# Patient Record
Sex: FEMALE | Race: WHITE | NOT HISPANIC OR LATINO | URBAN - METROPOLITAN AREA
[De-identification: names, ages, dates, MRNs, and addresses within clinical notes are randomized per-mention and may not be internally consistent; named-entity substitution may affect disease eponyms.]

---

## 2016-10-13 RX ORDER — FLECAINIDE ACETATE 50 MG
1 TABLET ORAL
Qty: 0 | Refills: 0 | COMMUNITY
Start: 2016-10-13

## 2017-04-26 ENCOUNTER — INPATIENT (INPATIENT)
Facility: HOSPITAL | Age: 82
LOS: 12 days | Discharge: HOME HEALTH | DRG: 689 | End: 2017-05-09
Attending: INTERNAL MEDICINE | Admitting: HOSPITALIST
Payer: MEDICARE

## 2017-04-26 VITALS
TEMPERATURE: 98 F | HEART RATE: 67 BPM | HEIGHT: 61 IN | OXYGEN SATURATION: 98 % | WEIGHT: 158.07 LBS | RESPIRATION RATE: 20 BRPM | DIASTOLIC BLOOD PRESSURE: 93 MMHG | SYSTOLIC BLOOD PRESSURE: 201 MMHG

## 2017-04-26 DIAGNOSIS — I10 ESSENTIAL (PRIMARY) HYPERTENSION: ICD-10-CM

## 2017-04-26 DIAGNOSIS — E03.8 OTHER SPECIFIED HYPOTHYROIDISM: ICD-10-CM

## 2017-04-26 DIAGNOSIS — A49.9 BACTERIAL INFECTION, UNSPECIFIED: ICD-10-CM

## 2017-04-26 DIAGNOSIS — Z29.9 ENCOUNTER FOR PROPHYLACTIC MEASURES, UNSPECIFIED: ICD-10-CM

## 2017-04-26 DIAGNOSIS — I48.2 CHRONIC ATRIAL FIBRILLATION: ICD-10-CM

## 2017-04-26 DIAGNOSIS — N39.0 URINARY TRACT INFECTION, SITE NOT SPECIFIED: ICD-10-CM

## 2017-04-26 DIAGNOSIS — K21.9 GASTRO-ESOPHAGEAL REFLUX DISEASE WITHOUT ESOPHAGITIS: ICD-10-CM

## 2017-04-26 LAB
ANION GAP SERPL CALC-SCNC: 15 MMOL/L — SIGNIFICANT CHANGE UP (ref 5–17)
APPEARANCE UR: CLEAR — SIGNIFICANT CHANGE UP
APTT BLD: 51.3 SEC — HIGH (ref 27.5–37.4)
BACTERIA # UR AUTO: ABNORMAL
BILIRUB UR-MCNC: NEGATIVE — SIGNIFICANT CHANGE UP
BUN SERPL-MCNC: 12 MG/DL — SIGNIFICANT CHANGE UP (ref 8–20)
CALCIUM SERPL-MCNC: 8.9 MG/DL — SIGNIFICANT CHANGE UP (ref 8.6–10.2)
CHLORIDE SERPL-SCNC: 93 MMOL/L — LOW (ref 98–107)
CO2 SERPL-SCNC: 25 MMOL/L — SIGNIFICANT CHANGE UP (ref 22–29)
COLOR SPEC: YELLOW — SIGNIFICANT CHANGE UP
CREAT SERPL-MCNC: 0.35 MG/DL — LOW (ref 0.5–1.3)
DIFF PNL FLD: NEGATIVE — SIGNIFICANT CHANGE UP
EPI CELLS # UR: SIGNIFICANT CHANGE UP
GLUCOSE SERPL-MCNC: 86 MG/DL — SIGNIFICANT CHANGE UP (ref 70–115)
GLUCOSE UR QL: NEGATIVE MG/DL — SIGNIFICANT CHANGE UP
HCT VFR BLD CALC: 38.7 % — SIGNIFICANT CHANGE UP (ref 37–47)
HGB BLD-MCNC: 13.2 G/DL — SIGNIFICANT CHANGE UP (ref 12–16)
INR BLD: 2.12 RATIO — HIGH (ref 0.88–1.16)
KETONES UR-MCNC: ABNORMAL
LEUKOCYTE ESTERASE UR-ACNC: ABNORMAL
MCHC RBC-ENTMCNC: 30.8 PG — SIGNIFICANT CHANGE UP (ref 27–31)
MCHC RBC-ENTMCNC: 34.1 G/DL — SIGNIFICANT CHANGE UP (ref 32–36)
MCV RBC AUTO: 90.4 FL — SIGNIFICANT CHANGE UP (ref 81–99)
NITRITE UR-MCNC: NEGATIVE — SIGNIFICANT CHANGE UP
PH UR: 6 — SIGNIFICANT CHANGE UP (ref 5–8)
PLATELET # BLD AUTO: 228 K/UL — SIGNIFICANT CHANGE UP (ref 150–400)
POTASSIUM SERPL-MCNC: 4.2 MMOL/L — SIGNIFICANT CHANGE UP (ref 3.5–5.3)
POTASSIUM SERPL-SCNC: 4.2 MMOL/L — SIGNIFICANT CHANGE UP (ref 3.5–5.3)
PROT UR-MCNC: NEGATIVE MG/DL — SIGNIFICANT CHANGE UP
PROTHROM AB SERPL-ACNC: 23.7 SEC — HIGH (ref 9.8–12.7)
RBC # BLD: 4.28 M/UL — LOW (ref 4.4–5.2)
RBC # FLD: 13.8 % — SIGNIFICANT CHANGE UP (ref 11–15.6)
RBC CASTS # UR COMP ASSIST: ABNORMAL /HPF (ref 0–4)
SODIUM SERPL-SCNC: 133 MMOL/L — LOW (ref 135–145)
SP GR SPEC: 1 — LOW (ref 1.01–1.02)
UROBILINOGEN FLD QL: NEGATIVE MG/DL — SIGNIFICANT CHANGE UP
WBC # BLD: 6.3 K/UL — SIGNIFICANT CHANGE UP (ref 4.8–10.8)
WBC # FLD AUTO: 6.3 K/UL — SIGNIFICANT CHANGE UP (ref 4.8–10.8)
WBC UR QL: SIGNIFICANT CHANGE UP

## 2017-04-26 PROCEDURE — 99285 EMERGENCY DEPT VISIT HI MDM: CPT

## 2017-04-26 PROCEDURE — 99222 1ST HOSP IP/OBS MODERATE 55: CPT | Mod: AI

## 2017-04-26 RX ORDER — LEVOTHYROXINE SODIUM 125 MCG
50 TABLET ORAL DAILY
Qty: 0 | Refills: 0 | Status: DISCONTINUED | OUTPATIENT
Start: 2017-04-26 | End: 2017-05-09

## 2017-04-26 RX ORDER — ERTAPENEM SODIUM 1 G/1
1000 INJECTION, POWDER, LYOPHILIZED, FOR SOLUTION INTRAMUSCULAR; INTRAVENOUS ONCE
Qty: 0 | Refills: 0 | Status: COMPLETED | OUTPATIENT
Start: 2017-04-26 | End: 2017-04-26

## 2017-04-26 RX ORDER — LACTOBACILLUS ACIDOPHILUS 100MM CELL
1 CAPSULE ORAL
Qty: 0 | Refills: 0 | Status: DISCONTINUED | OUTPATIENT
Start: 2017-04-26 | End: 2017-05-09

## 2017-04-26 RX ORDER — HYDRALAZINE HCL 50 MG
10 TABLET ORAL EVERY 6 HOURS
Qty: 0 | Refills: 0 | Status: DISCONTINUED | OUTPATIENT
Start: 2017-04-26 | End: 2017-05-09

## 2017-04-26 RX ORDER — WARFARIN SODIUM 2.5 MG/1
5 TABLET ORAL ONCE
Qty: 0 | Refills: 0 | Status: COMPLETED | OUTPATIENT
Start: 2017-04-26 | End: 2017-04-26

## 2017-04-26 RX ORDER — FLECAINIDE ACETATE 50 MG
50 TABLET ORAL
Qty: 0 | Refills: 0 | Status: DISCONTINUED | OUTPATIENT
Start: 2017-04-26 | End: 2017-05-09

## 2017-04-26 RX ORDER — SODIUM CHLORIDE 9 MG/ML
3 INJECTION INTRAMUSCULAR; INTRAVENOUS; SUBCUTANEOUS ONCE
Qty: 0 | Refills: 0 | Status: COMPLETED | OUTPATIENT
Start: 2017-04-26 | End: 2017-04-26

## 2017-04-26 RX ORDER — PANTOPRAZOLE SODIUM 20 MG/1
40 TABLET, DELAYED RELEASE ORAL
Qty: 0 | Refills: 0 | Status: DISCONTINUED | OUTPATIENT
Start: 2017-04-26 | End: 2017-05-09

## 2017-04-26 RX ORDER — METOPROLOL TARTRATE 50 MG
25 TABLET ORAL DAILY
Qty: 0 | Refills: 0 | Status: DISCONTINUED | OUTPATIENT
Start: 2017-04-26 | End: 2017-04-26

## 2017-04-26 RX ORDER — METOPROLOL TARTRATE 50 MG
50 TABLET ORAL DAILY
Qty: 0 | Refills: 0 | Status: DISCONTINUED | OUTPATIENT
Start: 2017-04-26 | End: 2017-05-09

## 2017-04-26 RX ORDER — ACETAMINOPHEN 500 MG
650 TABLET ORAL EVERY 6 HOURS
Qty: 0 | Refills: 0 | Status: DISCONTINUED | OUTPATIENT
Start: 2017-04-26 | End: 2017-05-09

## 2017-04-26 RX ORDER — ERTAPENEM SODIUM 1 G/1
1000 INJECTION, POWDER, LYOPHILIZED, FOR SOLUTION INTRAMUSCULAR; INTRAVENOUS EVERY 24 HOURS
Qty: 0 | Refills: 0 | Status: DISCONTINUED | OUTPATIENT
Start: 2017-04-26 | End: 2017-04-27

## 2017-04-26 RX ADMIN — ERTAPENEM SODIUM 120 MILLIGRAM(S): 1 INJECTION, POWDER, LYOPHILIZED, FOR SOLUTION INTRAMUSCULAR; INTRAVENOUS at 15:55

## 2017-04-26 RX ADMIN — SODIUM CHLORIDE 3 MILLILITER(S): 9 INJECTION INTRAMUSCULAR; INTRAVENOUS; SUBCUTANEOUS at 15:55

## 2017-04-26 NOTE — H&P ADULT - PMH
Atrial Fibrillation    Cardiac Pacemaker    GERD (Gastroesophageal Reflux Disease)    HTN - Hypertension    Hypercholesteremia    Hypothyroid

## 2017-04-26 NOTE — ED PROVIDER NOTE - PSH
H/O abdominal hysterectomy    History of pacemaker    S/P lumpectomy, left breast    S/P tonsillectomy

## 2017-04-26 NOTE — ED ADULT NURSE NOTE - OBJECTIVE STATEMENT
86 yo female presents with complaints of recurrent UTI with no relief from PO antibiotics.  Patient denies any  fevers.

## 2017-04-26 NOTE — ED ADULT TRIAGE NOTE - CHIEF COMPLAINT QUOTE
pt arrived to ed sent by pmd needs to have iv antibiotic for a uti, pt is resistant to all oral meds and needs iv gentamycin

## 2017-04-26 NOTE — ED PROVIDER NOTE - MEDICAL DECISION MAKING DETAILS
PT WITH UTI AND KLEBSIELLA EBSL GROWING ON CULTURE. NEEDS IV AB B/C SENSITIVE TO ONLY ERTAPENEM, IMIPENEM AND GENT PMD IS TOMI AKHTAR AND CARDIO IS [place]

## 2017-04-26 NOTE — H&P ADULT - HISTORY OF PRESENT ILLNESS
85 year old female with PMHx HTN, Chronic atrial fibrillation s/p PPM and UTI presents with dysuria and chills. Denies any fever, but does have urinary incontinence as well and occasional lower abdominal and lower back pain. Denies any headaches but gets occasional dizziness. She also gets sore throat attributable to post nasal drip.  She was treated for a UTI with oral antibiotics recently and was told to go to the ER today because of resistant bacteria. Outpatient urine culture with >100k CFU ESBL Klebsiella pneumonia sensitive only to Carbapenems, Aminoglycosides and Zosyn.  Denies any history of nephrolithiasis, but does get recurrent UTIs.    She lives independently and uses a walker.

## 2017-04-26 NOTE — ED ADULT NURSE REASSESSMENT NOTE - NS ED NURSE REASSESS COMMENT FT1
Patient resting in bed, awake, alert and oriented X3, resp even/unlabored, lungs CTAB, VS stable, afebrile, no acute distress noted, awaiting for available bed, will continue to monitor patient.
Patient awaiting admission orders. Will monitor and reassess.

## 2017-04-26 NOTE — H&P ADULT - ASSESSMENT
85 year old female with ESBL Klebsiella pneumonia UTI (Cystitis), without any evidence of sepsis or pyelonephritis. Given sensitivity patterns needs to be on IV antibiotics.  Her HTN is uncontrolled.  Chronic atrial fibrillation is rate controlled and INR therapeutic.

## 2017-04-26 NOTE — ED PROVIDER NOTE - PMH
Atrial Fibrillation    Cardiac Pacemaker    Cardiac Pacemaker    GERD (Gastroesophageal Reflux Disease)    HTN - Hypertension    Hypercholesteremia    Hypothyroid

## 2017-04-26 NOTE — ED PROVIDER NOTE - OBJECTIVE STATEMENT
PATEINT STATES TWO WEEKS AGO SHE STARTED PASSING URINE MORE OFTEBN THAN NORMAL. SHE IS INCONTINENT BUT NOTICED SHE HAD TO CHANGE MORE FREQUENTLY. SHE SAYS PAIN STARTED WITH URINATION AFTER THAT AND IT WOULD HURT HER.  PT WENT TO AN URGENT CARE AND WAS FOUND TO HAVE KLEBSIELLA IN HER URINE RESISTANT TO MANY MEDICATIONS.  SHE WAS SENT TO THE ER FOR ADMISSION FOR IV ANTIBIOTICS. PT BROUGHT SENSIVITY REPORT WITH HER. ERTAPENIM, IMIPENIM GENT SENSITIVE.

## 2017-04-27 DIAGNOSIS — N39.0 URINARY TRACT INFECTION, SITE NOT SPECIFIED: ICD-10-CM

## 2017-04-27 LAB
LACTATE BLDV-MCNC: 0.6 MMOL/L — LOW (ref 0.5–2)
TSH SERPL-MCNC: 4.12 UIU/ML — SIGNIFICANT CHANGE UP (ref 0.27–4.2)

## 2017-04-27 PROCEDURE — 99232 SBSQ HOSP IP/OBS MODERATE 35: CPT

## 2017-04-27 PROCEDURE — 99222 1ST HOSP IP/OBS MODERATE 55: CPT

## 2017-04-27 RX ORDER — ERTAPENEM SODIUM 1 G/1
1000 INJECTION, POWDER, LYOPHILIZED, FOR SOLUTION INTRAMUSCULAR; INTRAVENOUS EVERY 24 HOURS
Qty: 0 | Refills: 0 | Status: COMPLETED | OUTPATIENT
Start: 2017-04-27 | End: 2017-05-01

## 2017-04-27 RX ORDER — WARFARIN SODIUM 2.5 MG/1
2.5 TABLET ORAL ONCE
Qty: 0 | Refills: 0 | Status: COMPLETED | OUTPATIENT
Start: 2017-04-27 | End: 2017-04-27

## 2017-04-27 RX ADMIN — Medication 1 TABLET(S): at 08:24

## 2017-04-27 RX ADMIN — PANTOPRAZOLE SODIUM 40 MILLIGRAM(S): 20 TABLET, DELAYED RELEASE ORAL at 08:24

## 2017-04-27 RX ADMIN — WARFARIN SODIUM 5 MILLIGRAM(S): 2.5 TABLET ORAL at 01:21

## 2017-04-27 RX ADMIN — Medication 50 MICROGRAM(S): at 05:18

## 2017-04-27 RX ADMIN — Medication 10 MILLIGRAM(S): at 04:24

## 2017-04-27 RX ADMIN — Medication 10 MILLIGRAM(S): at 22:03

## 2017-04-27 RX ADMIN — Medication 1 TABLET(S): at 18:34

## 2017-04-27 RX ADMIN — Medication 50 MILLIGRAM(S): at 05:18

## 2017-04-27 RX ADMIN — Medication 1 TABLET(S): at 12:37

## 2017-04-27 RX ADMIN — ERTAPENEM SODIUM 120 MILLIGRAM(S): 1 INJECTION, POWDER, LYOPHILIZED, FOR SOLUTION INTRAMUSCULAR; INTRAVENOUS at 18:35

## 2017-04-27 RX ADMIN — WARFARIN SODIUM 2.5 MILLIGRAM(S): 2.5 TABLET ORAL at 22:03

## 2017-04-27 RX ADMIN — Medication 650 MILLIGRAM(S): at 08:24

## 2017-04-27 RX ADMIN — Medication 50 MILLIGRAM(S): at 18:34

## 2017-04-27 NOTE — PROGRESS NOTE ADULT - SUBJECTIVE AND OBJECTIVE BOX
HOSPITALIST PROGRESS NOTE    TABITHA HERMOSILLO  [id number]  85yFemale    Patient is a 85y old  Female who presents with a chief complaint of 'urinary tract infection' (2017 21:29)      SUBJECTIVE:   Chart reviewed since last visit.  Patient seen and examined at bedside earlier today for ESBL UTI.  Still feels chills, no fever.  Lower abdominal and lower back pain. Still has dysuria.  No dizziness, nausea or vomiting.  No chest pain, dyspnea or cough.      OBJECTIVE:  Vital Signs Last 24 Hrs  T(C): 36.8, Max: 36.8 ( @ 11:26)  T(F): 98.2, Max: 98.2 ( @ 11:26)  HR: 61 (60 - 70)  BP: 130/90 (108/72 - 185/94)  RR: 18 (18 - 18)  SpO2: 98% (96% - 100%)    PHYSICAL EXAMINATION  General: NAD[+]   nontoxic[+]     HEENT: AT/NC[+]    NECK: Supple[+]  Carotid Bruit[+]  CVS:   Irregular[+]  S1+S2[+]   Murmur[+]  RESP: Fair air entry bilaterally[+]   Clear sounds[+]    GI: Soft[+]  Nondistended[+]   Tender lower abdomen   Bowel Sounds[+]    : suprapubic tenderness[+]   CVA Tenderness[-]   Andrea[-]  MS: FROM[+]   Edema[-]  CNS: AAOx3[+]    No gross deficit appreciated  INTEG: Skin is Warm[+]  dry[+]   PSYCH: Fair Mood, Affect    I&O's Summary                          13.2   6.3   )-----------( 228      ( 2017 15:36 )             38.7     PT/INR - ( 2017 15:36 )   PT: 23.7 sec;   INR: 2.12 ratio         PTT - ( 2017 15:36 )  PTT:51.3 sec      133<L>  |  93<L>  |  12.0  ----------------------------<  86  4.2   |  25.0  |  0.35<L>    Ca    8.9      2017 15:36          Urinalysis Basic - ( 2017 15:50 )    Color: Yellow / Appearance: Clear / S.005 / pH: x  Gluc: x / Ketone: Small  / Bili: Negative / Urobili: Negative mg/dL   Blood: x / Protein: Negative mg/dL / Nitrite: Negative   Leuk Esterase: Trace / RBC: 6-10 /HPF / WBC 3-5   Sq Epi: x / Non Sq Epi: Occasional / Bacteria: Occasional        Culture:  Culture - Urine (17 @ 15:50)    Specimen Source: .Urine Catheterized    Culture Results:   10,000 - 49,000 CFU/mL Gram Negative Rods Identification and  susceptibility to follow.          MEDICATIONS  (STANDING):  levothyroxine 50MICROGram(s) Oral daily  pantoprazole    Tablet 40milliGRAM(s) Oral before breakfast  multivitamin 1Tablet(s) Oral daily  flecainide 50milliGRAM(s) Oral two times a day  lactobacillus acidophilus 1Tablet(s) Oral two times a day with meals  metoprolol succinate ER 50milliGRAM(s) Oral daily  ertapenem  IVPB 1000milliGRAM(s) IV Intermittent every 24 hours      MEDICATIONS  (PRN):  acetaminophen   Tablet 650milliGRAM(s) Oral every 6 hours PRN For Temp greater than 38 C (100.4 F)  hydrALAZINE Injectable 10milliGRAM(s) IV Push every 6 hours PRN SBP>180, DBP>95

## 2017-04-27 NOTE — CONSULT NOTE ADULT - SUBJECTIVE AND OBJECTIVE BOX
NPP INFECTIOUS DISEASES AND INTERNAL MEDICINE [facility]  =======================================================  Moustapha Cedeño MD [facility]   Jorge Ring MD  Diplomates American Board of Internal Medicine and Infectious Diseases  =======================================================    [id number]  TABITHA HERMOSILLO is a 85y  Female     85 y.o. F with HTN, Chronic atrial fibrillation s/p PPM, recurrent UTI presents with dysuria and chills. + urinary incontinence as well and occasional lower abdominal and lower back pain.     Outpatient urine culture with >100k CFU ESBL Klebsiella pneumonia sensitive only to Carbapenems, Aminoglycosides and Zosyn.  Denies any history of nephrolithiasis, but does get recurrent UTIs.    =======================================================  Past Medical & Surgical Hx:  =====================  PAST MEDICAL & SURGICAL HISTORY:  Cardiac Pacemaker  Cardiac Pacemaker  Atrial Fibrillation  Hypothyroid  Hypercholesteremia  GERD (Gastroesophageal Reflux Disease)  HTN - Hypertension  S/P lumpectomy, left breast  S/P tonsillectomy  History of pacemaker  H/O abdominal hysterectomy      Problem List:  ==========  HEALTH ISSUES - PROBLEM Dx:  Prophylactic measure: Prophylactic measure  Gastroesophageal reflux disease without esophagitis: Gastroesophageal reflux disease without esophagitis  Other specified hypothyroidism: Other specified hypothyroidism  Chronic atrial fibrillation: Chronic atrial fibrillation  Uncontrolled hypertension: Uncontrolled hypertension  ESBL (extended spectrum beta-lactamase) producing bacteria infection: ESBL (extended spectrum beta-lactamase) producing bacteria infection    Social Hx:  =======  no toxic habits    FAMILY HISTORY:  No pertinent family history in first degree relatives      Allergies    No Known Allergies    Intolerances       MEDICATIONS  (STANDING):  ertapenem  IVPB 1000milliGRAM(s) IV Intermittent every 24 hours  levothyroxine 50MICROGram(s) Oral daily  pantoprazole    Tablet 40milliGRAM(s) Oral before breakfast  multivitamin 1Tablet(s) Oral daily  flecainide 50milliGRAM(s) Oral two times a day  lactobacillus acidophilus 1Tablet(s) Oral two times a day with meals  metoprolol succinate ER 50milliGRAM(s) Oral daily       ANTIBIOTICS:      =======================================================  REVIEW OF SYSTEMS:  CONSTITUTIONAL:  as per HPI  HEENT:  Eyes:  No diplopia or blurred vision. ENT:  No earache, sore throat or runny nose.  CARDIOVASCULAR:  No pressure, squeezing, strangling, tightness, heaviness or aching about the chest, neck, axilla or epigastrium.  RESPIRATORY:  No cough, shortness of breath, PND or orthopnea.  GASTROINTESTINAL:  No nausea, vomiting or diarrhea.  GENITOURINARY:  No dysuria, frequency or urgency. No Blood in urine  MUSCULOSKELETAL:  no joint aches, no muscle pain  SKIN:  No change in skin, hair or nails.  NEUROLOGIC:  No paresthesias, fasciculations, seizures or weakness.  PSYCHIATRIC:  No disorder of thought or mood.  ENDOCRINE:  No heat or cold intolerance, polyuria or polydipsia.  HEMATOLOGICAL:  No easy bruising or bleeding.     =======================================================  I&O's Detail      Physical Exam:  ============  Vital Signs Last 24 Hrs  T(C): 36.8, Max: 36.8 ( @ 19:08)  T(F): 98.2, Max: 98.3 ( @ 19:08)  HR: 61 (60 - 70)  BP: 130/90 (108/72 - 201/93)  RR: 18 (18 - 20)  SpO2: 98% (96% - 100%)  Height (cm): 154.9 ( @ 13:17)  Weight (kg): 71.7 ( @ 13:17)  BMI (kg/m2): 29.9 ( @ 13:17)  BSA (m2): 1.71 ( @ 13:17)    GEN: NAD, pleasant  HEENT: normocephalic and atraumatic. EOMI. LEORA.    NECK: Supple. No carotid bruits.  No lymphadenopathy or thyromegaly.  LUNGS: Clear to auscultation.  HEART: Regular rate and rhythm without murmur.  ABDOMEN: Soft, nontender, and nondistended.  Positive bowel sounds.    EXTREMITIES: Without any cyanosis, clubbing, rash, lesions or edema.  NEUROLOGIC: Cranial nerves II through XII are grossly intact.  PSYCHIATRIC: Appropriate affect .  SKIN: No ulceration or induration present.    =======================================================  Labs:  ====       133<L>  |  93<L>  |  12.0  ----------------------------<  86  4.2   |  25.0  |  0.35<L>    Ca    8.9      2017 15:36                            13.2   6.3   )-----------( 228      ( 2017 15:36 )             38.7       PT/INR - ( 2017 15:36 )   PT: 23.7 sec;   INR: 2.12 ratio         PTT - ( 2017 15:36 )  PTT:51.3 sec  Urinalysis Basic - ( 2017 15:50 )    Color: Yellow / Appearance: Clear / S.005 / pH: x  Gluc: x / Ketone: Small  / Bili: Negative / Urobili: Negative mg/dL   Blood: x / Protein: Negative mg/dL / Nitrite: Negative   Leuk Esterase: Trace / RBC: 6-10 /HPF / WBC 3-5   Sq Epi: x / Non Sq Epi: Occasional / Bacteria: Occasional       RECENT CULTURES:   .Urine Catheterized XXXX XXXX   10,000 - 49,000 CFU/mL Gram Negative Rods Identification and  susceptibility to follow. NPP INFECTIOUS DISEASES AND INTERNAL MEDICINE [facility] [place]  =======================================================  Moustapha Cedeño MD [facility]   Jorge Ring MD  Diplomates American Board of Internal Medicine and Infectious Diseases  =======================================================    [id number]  TABITHA HERMOSILLO is a 85y  Female     85 y.o. F with HTN, Chronic atrial fibrillation s/p PPM, recurrent UTI presents with dysuria and chills.   patient reported having symptoms several weeks back. but this past Saturday 5 days prior, developed lower abd pain, assoc with dysuria, foul smelling and TURBID urine.  She went to [facility] on her daughter's insistence (AROLDO) and was given 5 days of MACROBID with little to no improvement.   UPON REVIEW of cultures at [facility]. told that she has Resistant MDR Kleb pneumoniae and sent to [facility] for IV antibiotics.      Outpatient urine culture with >100k CFU ESBL Klebsiella pneumonia sensitive only to Carbapenems, Aminoglycosides and Zosyn.  Denies any history of nephrolithiasis, but does get recurrent UTIs.    + Urinary incontinence  DOES NOT LIKE to drink water.     =======================================================  Past Medical & Surgical Hx:  =====================  PAST MEDICAL & SURGICAL HISTORY:  Cardiac Pacemaker  Cardiac Pacemaker  Atrial Fibrillation  Hypothyroid  Hypercholesteremia  GERD (Gastroesophageal Reflux Disease)  HTN - Hypertension  S/P lumpectomy, left breast  S/P tonsillectomy  History of pacemaker  H/O abdominal hysterectomy    Problem List:  ==========  HEALTH ISSUES - PROBLEM Dx:  Prophylactic measure: Prophylactic measure  Gastroesophageal reflux disease without esophagitis: Gastroesophageal reflux disease without esophagitis  Other specified hypothyroidism: Other specified hypothyroidism  Chronic atrial fibrillation: Chronic atrial fibrillation  Uncontrolled hypertension: Uncontrolled hypertension  ESBL (extended spectrum beta-lactamase) producing bacteria infection: ESBL (extended spectrum beta-lactamase) producing bacteria infection    Social Hx:  =======  no toxic habits    FAMILY HISTORY:  No pertinent family history in first degree relatives      Allergies    No Known Allergies    Intolerances       MEDICATIONS  (STANDING):  ertapenem  IVPB 1000milliGRAM(s) IV Intermittent every 24 hours  levothyroxine 50MICROGram(s) Oral daily  pantoprazole    Tablet 40milliGRAM(s) Oral before breakfast  multivitamin 1Tablet(s) Oral daily  flecainide 50milliGRAM(s) Oral two times a day  lactobacillus acidophilus 1Tablet(s) Oral two times a day with meals  metoprolol succinate ER 50milliGRAM(s) Oral daily         =======================================================  REVIEW OF SYSTEMS:  CONSTITUTIONAL:  as per HPI  HEENT:  Eyes:  No diplopia or blurred vision. ENT:  No earache, sore throat or runny nose.  CARDIOVASCULAR:  No pressure, squeezing, strangling, tightness, heaviness or aching about the chest, neck, axilla or epigastrium.  RESPIRATORY:  No cough, shortness of breath, PND or orthopnea.  GASTROINTESTINAL:  No nausea, vomiting or diarrhea.  GENITOURINARY:  + LOWER ABD PAIN  MUSCULOSKELETAL:  no joint aches, no muscle pain  SKIN:  No change in skin, hair or nails.  NEUROLOGIC:  No paresthesias, fasciculations, seizures or weakness.  PSYCHIATRIC:  No disorder of thought or mood.  ENDOCRINE:  No heat or cold intolerance, polyuria or polydipsia.  HEMATOLOGICAL:  No easy bruising or bleeding.     =======================================================  Physical Exam:  ============  Vital Signs Last 24 Hrs  T(C): 36.8, Max: 36.8 ( @ 19:08)  T(F): 98.2, Max: 98.3 ( @ 19:08)  HR: 61 (60 - 70)  BP: 130/90 (108/72 - 201/93)  RR: 18 (18 - 20)  SpO2: 98% (96% - 100%)  Height (cm): 154.9 ( @ 13:17)  Weight (kg): 71.7 ( @ 13:17)  BMI (kg/m2): 29.9 ( @ 13:17)  BSA (m2): 1.71 ( @ 13:17)    GEN: NAD, pleasant, obese  HEENT: normocephalic and atraumatic. EOMI. LEORA.    NECK: Supple. No carotid bruits.  No lymphadenopathy or thyromegaly.  LUNGS: Clear to auscultation.  HEART: Regular rate and rhythm without murmur.  ABDOMEN: Soft,  and nondistended.  Positive bowel sounds.  + SUPRAPUBIC TENDERNESS  NO CVA tenderness  EXTREMITIES: Without any cyanosis, clubbing, rash, lesions or edema.  NEUROLOGIC: Cranial nerves II through XII are grossly intact.  PSYCHIATRIC: Appropriate affect .  SKIN: No ulceration or induration present.    =======================================================  Labs:  ====       133<L>  |  93<L>  |  12.0  ----------------------------<  86  4.2   |  25.0  |  0.35<L>    Ca    8.9      2017 15:36                            13.2   6.3   )-----------( 228      ( 2017 15:36 )             38.7       PT/INR - ( 2017 15:36 )   PT: 23.7 sec;   INR: 2.12 ratio         PTT - ( 2017 15:36 )  PTT:51.3 sec  Urinalysis Basic - ( 2017 15:50 )    Color: Yellow / Appearance: Clear / S.005 / pH: x  Gluc: x / Ketone: Small  / Bili: Negative / Urobili: Negative mg/dL   Blood: x / Protein: Negative mg/dL / Nitrite: Negative   Leuk Esterase: Trace / RBC: 6-10 /HPF / WBC 3-5   Sq Epi: x / Non Sq Epi: Occasional / Bacteria: Occasional       RECENT CULTURES:   .Urine Catheterized XXXX XXXX   10,000 - 49,000 CFU/mL Gram Negative Rods Identification and  susceptibility to follow.

## 2017-04-27 NOTE — CONSULT NOTE ADULT - ASSESSMENT
This 85 y.o. woman with Reucrrent UTI; now with ESBL Kleb pneumoniae (PER REPORT); outpatient URINE CX not available for review This 85 y.o. woman with Recurrent UTI; now with ESBL Kleb pneumoniae; outpatient URINE CX available for review on [employer]

## 2017-04-27 NOTE — PROGRESS NOTE ADULT - ASSESSMENT
85 year old female with ESBL Klebsiella pneumonia UTI (Cystitis), without any evidence of sepsis or pyelonephritis. Given sensitivity patterns needs to be on IV antibiotics. urine culture obtained with GNR.  HTN  uncontrolled at admission - improved.  Chronic atrial fibrillation is rate controlled and INR therapeutic.  TSH - 4.2, normal.

## 2017-04-28 DIAGNOSIS — K86.9 DISEASE OF PANCREAS, UNSPECIFIED: ICD-10-CM

## 2017-04-28 LAB
ALBUMIN SERPL ELPH-MCNC: 3.3 G/DL — SIGNIFICANT CHANGE UP (ref 3.3–5.2)
ALP SERPL-CCNC: 574 U/L — HIGH (ref 40–120)
ALT FLD-CCNC: 89 U/L — HIGH
AMYLASE P1 CFR SERPL: 27 U/L — LOW (ref 36–128)
ANION GAP SERPL CALC-SCNC: 10 MMOL/L — SIGNIFICANT CHANGE UP (ref 5–17)
AST SERPL-CCNC: 84 U/L — HIGH
BILIRUB SERPL-MCNC: 1 MG/DL — SIGNIFICANT CHANGE UP (ref 0.4–2)
BUN SERPL-MCNC: 14 MG/DL — SIGNIFICANT CHANGE UP (ref 8–20)
CALCIUM SERPL-MCNC: 9.1 MG/DL — SIGNIFICANT CHANGE UP (ref 8.6–10.2)
CHLORIDE SERPL-SCNC: 100 MMOL/L — SIGNIFICANT CHANGE UP (ref 98–107)
CO2 SERPL-SCNC: 29 MMOL/L — SIGNIFICANT CHANGE UP (ref 22–29)
CREAT SERPL-MCNC: 0.47 MG/DL — LOW (ref 0.5–1.3)
GLUCOSE SERPL-MCNC: 86 MG/DL — SIGNIFICANT CHANGE UP (ref 70–115)
INR BLD: 1.85 RATIO — HIGH (ref 0.88–1.16)
LIDOCAIN IGE QN: 60 U/L — HIGH (ref 22–51)
POTASSIUM SERPL-MCNC: 4.4 MMOL/L — SIGNIFICANT CHANGE UP (ref 3.5–5.3)
POTASSIUM SERPL-SCNC: 4.4 MMOL/L — SIGNIFICANT CHANGE UP (ref 3.5–5.3)
PROT SERPL-MCNC: 6.8 G/DL — SIGNIFICANT CHANGE UP (ref 6.6–8.7)
PROTHROM AB SERPL-ACNC: 20.6 SEC — HIGH (ref 9.8–12.7)
SODIUM SERPL-SCNC: 139 MMOL/L — SIGNIFICANT CHANGE UP (ref 135–145)

## 2017-04-28 PROCEDURE — 99232 SBSQ HOSP IP/OBS MODERATE 35: CPT

## 2017-04-28 PROCEDURE — 74178 CT ABD&PLV WO CNTR FLWD CNTR: CPT | Mod: 26

## 2017-04-28 RX ORDER — WARFARIN SODIUM 2.5 MG/1
5 TABLET ORAL ONCE
Qty: 0 | Refills: 0 | Status: COMPLETED | OUTPATIENT
Start: 2017-04-28 | End: 2017-04-28

## 2017-04-28 RX ORDER — AMLODIPINE BESYLATE 2.5 MG/1
2.5 TABLET ORAL DAILY
Qty: 0 | Refills: 0 | Status: DISCONTINUED | OUTPATIENT
Start: 2017-04-28 | End: 2017-04-30

## 2017-04-28 RX ADMIN — Medication 50 MILLIGRAM(S): at 05:30

## 2017-04-28 RX ADMIN — Medication 1 TABLET(S): at 10:48

## 2017-04-28 RX ADMIN — PANTOPRAZOLE SODIUM 40 MILLIGRAM(S): 20 TABLET, DELAYED RELEASE ORAL at 10:48

## 2017-04-28 RX ADMIN — AMLODIPINE BESYLATE 2.5 MILLIGRAM(S): 2.5 TABLET ORAL at 17:32

## 2017-04-28 RX ADMIN — ERTAPENEM SODIUM 120 MILLIGRAM(S): 1 INJECTION, POWDER, LYOPHILIZED, FOR SOLUTION INTRAMUSCULAR; INTRAVENOUS at 17:32

## 2017-04-28 RX ADMIN — WARFARIN SODIUM 5 MILLIGRAM(S): 2.5 TABLET ORAL at 22:29

## 2017-04-28 RX ADMIN — Medication 50 MICROGRAM(S): at 05:30

## 2017-04-28 RX ADMIN — Medication 50 MILLIGRAM(S): at 17:32

## 2017-04-28 RX ADMIN — Medication 1 TABLET(S): at 17:32

## 2017-04-28 NOTE — PROGRESS NOTE ADULT - PROBLEM SELECTOR PLAN 1
Continue IV antibiotics  Follow urine culture Id/S, blood cultures.  Obtain CT scan abdomen pelvis Continue IV antibiotics  Follow urine culture Id/S, blood cultures.  ID consult

## 2017-04-28 NOTE — PROGRESS NOTE ADULT - PROBLEM SELECTOR PLAN 2
Continue Metoprolol.  PRN Hydralazine Check LFT  Lipase  CA -19-9  Surgery Consult - [place]  CT pancreatic protocol

## 2017-04-28 NOTE — PROGRESS NOTE ADULT - SUBJECTIVE AND OBJECTIVE BOX
HOSPITALIST PROGRESS NOTE    TABITHA HERMOSILLO  [id number]  85yFemale    Patient is a 85y old  Female who presents with a chief complaint of 'urinary tract infection' (26 Apr 2017 21:29)      SUBJECTIVE:   Chart reviewed since last visit.  Patient seen and examined at bedside.      OBJECTIVE:  Vital Signs Last 24 Hrs  T(C): 36.3, Max: 36.8 (04-27 @ 20:10)  T(F): 97.4, Max: 98.3 (04-27 @ 20:10)  HR: 68 (65 - 73)  BP: 143/64 (143/64 - 191/89)  BP(mean): --  RR: 20 (18 - 20)  SpO2: 95% (95% - 98%)    PHYSICAL EXAMINATION  General: NAD[]   nontoxic[]   ill-appearing[]  Obese[]  HEENT: AT/NC[]  PERRLA[]  EOMI[]   Moist oral mucosa[]  Pharyngeal exudates[]  NECK: Supple[]  JVD[] Carotid bruit[]  CVS: RRR[]  Irregular[]  S1+S2[]   Murmur[]  RESP: Fair air entry bilaterally[]   Clear sounds[]   poor effort []  wheeze[]   Crackles[]  GI: Soft[]  Nondistended[]   Nontender[]   Bowel Sounds[]  Mass[]   HSM[]   Ascites[]  : suprapubic tenderness[]   CVA Tenderness[]   Andrea[]  MS: FROM[]   Edema[]  CNS: AAOx3[]    Motor strength /5     DTR +    Sensory    Coordination    Gait  INTEG: Skin is Warm[]  dry[] Lesion[] Decubitus[]  PSYCH: Mood, Affect    MONITOR:  CAPILLARY BLOOD GLUCOSE        I&O's Summary      PT/INR - ( 28 Apr 2017 06:26 )   PT: 20.6 sec;   INR: 1.85 ratio                         Culture:    TTE:    RADIOLOGY        MEDICATIONS  (STANDING):  levothyroxine 50MICROGram(s) Oral daily  pantoprazole    Tablet 40milliGRAM(s) Oral before breakfast  multivitamin 1Tablet(s) Oral daily  flecainide 50milliGRAM(s) Oral two times a day  lactobacillus acidophilus 1Tablet(s) Oral two times a day with meals  metoprolol succinate ER 50milliGRAM(s) Oral daily  ertapenem  IVPB 1000milliGRAM(s) IV Intermittent every 24 hours  amLODIPine   Tablet 2.5milliGRAM(s) Oral daily  warfarin 5milliGRAM(s) Oral once      MEDICATIONS  (PRN):  acetaminophen   Tablet 650milliGRAM(s) Oral every 6 hours PRN For Temp greater than 38 C (100.4 F)  hydrALAZINE Injectable 10milliGRAM(s) IV Push every 6 hours PRN SBP>180, DBP>95 HOSPITALIST PROGRESS NOTE    TABITHA HERMOSILLO  [id number]  85yFemale    Patient is a 85y old  Female who presents with a chief complaint of 'urinary tract infection' (26 Apr 2017 21:29)      SUBJECTIVE:   Chart reviewed since last visit.  Patient seen and examined at bedside for MDR UTI.  Denies fever or chills - still with lower abdominal and back pain.  Denies any upper abdominal pain, nausea, vomiting.  having diarrhea soft BM 2-3 x / day      OBJECTIVE:  Vital Signs Last 24 Hrs  T(C): 36.3, Max: 36.8 (04-27 @ 20:10)  T(F): 97.4, Max: 98.3 (04-27 @ 20:10)  HR: 68 (65 - 73)  BP: 143/64 (143/64 - 191/89)  RR: 20 (18 - 20)  SpO2: 95% (95% - 98%)    PHYSICAL EXAMINATION  General: NAD[+]   nontoxic[+]     HEENT: AT/NC[+]    NECK: Supple[+]  Carotid Bruit[+]  CVS:   Irregular[+]  S1+S2[+]   Murmur[+]  RESP: Fair air entry bilaterally[+]   Clear sounds[+]    GI: Soft[+]  Nondistended[+]   Tender lower abdomen   Bowel Sounds[+]    : suprapubic tenderness[+]   CVA Tenderness[-]   Andrea[-]  MS: FROM[+]   Edema[-]  CNS: AAOx3[+]    No gross deficit appreciated  INTEG: Skin is Warm[+]  dry[+]   PSYCH: Fair Mood, Affect      Culture - Urine (04.26.17 @ 15:50)    Specimen Source: .Urine Catheterized    Culture Results:   10,000 - 49,000 CFU/mL Gram Negative Rods Identification and  susceptibility to follow.      PT/INR - ( 28 Apr 2017 06:26 )   PT: 20.6 sec;   INR: 1.85 ratio           RADIOLOGY  IMPRESSION:    Dilatation of the intrahepatic and extrahepatic biliary tree secondary to   obstruction of the distal common bile duct. 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    Hyperdense cyst as well as simple cysts in the right kidney. The kidneys   and ureters demonstrate no other abnormality.  No abnormality of the   urinary bladder demonstrated.    These critical values were discussed by Dr. Enmanuel Ross with Dr. MEDHAT Akers on 4/28/2017 11:20 AM with read back.        ENMANUEL ROSS M.D., ATTENDING RADIOLOGIST  This document has been electronically signed. Apr 28 2017 11:21AM      MEDICATIONS  (STANDING):  levothyroxine 50MICROGram(s) Oral daily  pantoprazole    Tablet 40milliGRAM(s) Oral before breakfast  multivitamin 1Tablet(s) Oral daily  flecainide 50milliGRAM(s) Oral two times a day  lactobacillus acidophilus 1Tablet(s) Oral two times a day with meals  metoprolol succinate ER 50milliGRAM(s) Oral daily  ertapenem  IVPB 1000milliGRAM(s) IV Intermittent every 24 hours  amLODIPine   Tablet 2.5milliGRAM(s) Oral daily  warfarin 5milliGRAM(s) Oral once      MEDICATIONS  (PRN):  acetaminophen   Tablet 650milliGRAM(s) Oral every 6 hours PRN For Temp greater than 38 C (100.4 F)  hydrALAZINE Injectable 10milliGRAM(s) IV Push every 6 hours PRN SBP>180, DBP>95

## 2017-04-28 NOTE — CONSULT NOTE ADULT - SUBJECTIVE AND OBJECTIVE BOX
85 year old female with PMHx HTN, Chronic atrial fibrillation s/p PPM and UTI presents with dysuria and chills. Denies any fever, but does have urinary incontinence as well and occasional lower abdominal and lower back pain. Denies any headaches but gets occasional dizziness. She also gets sore throat attributable to post nasal drip.  She was treated for a UTI with oral antibiotics recently and was told to go to the ER today because of resistant bacteria. Outpatient urine culture with >100k CFU ESBL Klebsiella pneumonia sensitive only to Carbapenems, Aminoglycosides and Zosyn.  Denies any history of nephrolithiasis, but does get recurrent UTIs.    Pt had CT of A/P which showed pancreatic mass at head of pancreas measuring 2.8 x 2.7cm. Dilation of GB CBD of 1.6cm. PT states she knew about this pancreatic mass 10 years ago, where she had a bx and it was shown to be negative. She denies in upper abdominal pain, Nausea, or vomiting. Pain is in the lower quadrants b/l. She is being admitted to medicine for a UTI       CURRENT MEDICAL PROBLEMS:  Pancreatic mass  Urinary tract infection without hematuria, site unspecified  Prophylactic measure  Gastroesophageal reflux disease without esophagitis  Other specified hypothyroidism  Chronic atrial fibrillation  Uncontrolled hypertension  ESBL (extended spectrum beta-lactamase) producing bacteria infection      PAST MEDICAL HISTORY:  Cardiac Pacemaker  Cardiac Pacemaker  Atrial Fibrillation  Hypothyroid  Hypercholesteremia  GERD (Gastroesophageal Reflux Disease)  HTN - Hypertension      SURGICAL HISTORY:  S/P lumpectomy, left breast  S/P tonsillectomy  History of pacemaker  H/O abdominal hysterectomy      REVIEW OF SYSTEMS:    CONSTITUTIONAL: No fever, weight loss, or fatigue  ENMT:  No difficulty hearing, tinnitus, vertigo; No sinus or throat pain  NECK: No pain or stiffness  BREASTS: No pain, masses, or nipple discharge  RESPIRATORY: No cough, wheezing, chills or hemoptysis; No shortness of breath  CARDIOVASCULAR: No chest pain, palpitations, dizziness, or leg swelling  GASTROINTESTINAL: No abdominal or epigastric pain. No nausea, vomiting, or hematemesis; No diarrhea or constipation. No melena or hematochezia.  GENITOURINARY: No dysuria, frequency, hematuria, or incontinence  NEUROLOGICAL: No headaches, memory loss, loss of strength, numbness, or tremors  SKIN: No itching, burning, rashes, or lesions   MUSCULOSKELETAL: No joint pain or swelling; No muscle, back, or extremity pain  ALLERGY AND IMMUNOLOGIC: No hives or eczema      MEDICATIONS  (STANDING):  levothyroxine 50MICROGram(s) Oral daily  pantoprazole    Tablet 40milliGRAM(s) Oral before breakfast  multivitamin 1Tablet(s) Oral daily  flecainide 50milliGRAM(s) Oral two times a day  lactobacillus acidophilus 1Tablet(s) Oral two times a day with meals  metoprolol succinate ER 50milliGRAM(s) Oral daily  ertapenem  IVPB 1000milliGRAM(s) IV Intermittent every 24 hours  amLODIPine   Tablet 2.5milliGRAM(s) Oral daily  warfarin 5milliGRAM(s) Oral once    MEDICATIONS  (PRN):  acetaminophen   Tablet 650milliGRAM(s) Oral every 6 hours PRN For Temp greater than 38 C (100.4 F)  hydrALAZINE Injectable 10milliGRAM(s) IV Push every 6 hours PRN SBP>180, DBP>95      Allergies    No Known Allergies    Intolerances        SOCIAL HISTORY:    FAMILY HISTORY:  No pertinent family history in first degree relatives      Vital Signs Last 24 Hrs  T(C): 36.3, Max: 36.8 (04-27 @ 20:10)  T(F): 97.4, Max: 98.3 (04-27 @ 20:10)  HR: 71 (65 - 73)  BP: 166/80 (143/64 - 191/89)  BP(mean): --  RR: 20 (18 - 20)  SpO2: 95% (95% - 98%)    PHYSICAL EXAM:    GENERAL: NAD, well-groomed, well-developed  HEAD:  Atraumatic, Normocephalic  EYES: EOMI, PERRLA, conjunctiva and sclera clear  ENMT: No tonsillar erythema, exudates, or enlargement; Moist mucous membranes, Good dentition, No lesions  NECK: Supple, No JVD, Normal thyroid  NERVOUS SYSTEM:  Alert & Oriented X3, Good concentration; Motor Strength 5/5 B/L upper and lower extremities; DTRs 2+ intact and symmetric  CHEST/LUNG: Clear to percussion bilaterally; No rales, rhonchi, wheezing, or rubs  HEART: Regular rate and rhythm; No murmurs, rubs, or gallops  ABDOMEN: Soft, TTP of the lower quadrants  Nondistended; Bowel sounds present  EXTREMITIES:  2+ Peripheral Pulses, No clubbing, cyanosis, or edema  LYMPH: No lymphadenopathy noted  SKIN: No rashes or lesions    LABS:    04-28    139  |  100  |  14.0  ----------------------------<  86  4.4   |  29.0  |  0.47<L>    Ca    9.1      28 Apr 2017 17:54    TPro  6.8  /  Alb  3.3  /  TBili  1.0  /  DBili  x   /  AST  84<H>  /  ALT  89<H>  /  AlkPhos  574<H>  04-28    PT/INR - ( 28 Apr 2017 06:26 )   PT: 20.6 sec;   INR: 1.85 ratio                   RADIOLOGY & ADDITIONAL STUDIES:

## 2017-04-28 NOTE — PROGRESS NOTE ADULT - SUBJECTIVE AND OBJECTIVE BOX
TABITHA HERMOSILLO is a 85y Female with HPI:  85 year old female with PMHx HTN, Chronic atrial fibrillation s/p PPM and UTI presents with dysuria and chills. Denies any fever, but does have urinary incontinence as well and occasional lower abdominal and lower back pain. Denies any headaches but gets occasional dizziness. She also gets sore throat attributable to post nasal drip.  She was treated for a UTI with oral antibiotics recently and was told to go to the ER today because of resistant bacteria. Outpatient urine culture with >100k CFU ESBL Klebsiella pneumonia sensitive only to Carbapenems, Aminoglycosides and Zosyn.  Denies any history of nephrolithiasis, but does get recurrent UTIs.  PT IN NAD STILL IN ER    She lives independently and uses a walker. (26 Apr 2017 21:29)        Allergies:  No Known Allergies      Medications:  acetaminophen   Tablet 650milliGRAM(s) Oral every 6 hours PRN  levothyroxine 50MICROGram(s) Oral daily  pantoprazole    Tablet 40milliGRAM(s) Oral before breakfast  multivitamin 1Tablet(s) Oral daily  flecainide 50milliGRAM(s) Oral two times a day  lactobacillus acidophilus 1Tablet(s) Oral two times a day with meals  metoprolol succinate ER 50milliGRAM(s) Oral daily  hydrALAZINE Injectable 10milliGRAM(s) IV Push every 6 hours PRN  ertapenem  IVPB 1000milliGRAM(s) IV Intermittent every 24 hours  amLODIPine   Tablet 2.5milliGRAM(s) Oral daily  warfarin 5milliGRAM(s) Oral once      ANTIBIOTICS:         Review of Systems: - Negative except as mentioned above     Physical Exam:  ICU Vital Signs Last 24 Hrs  T(C): 36.3, Max: 36.8 (04-27 @ 20:10)  T(F): 97.4, Max: 98.3 (04-27 @ 20:10)  HR: 71 (65 - 73)  BP: 166/80 (143/64 - 191/89)  BP(mean): --  ABP: --  ABP(mean): --  RR: 20 (18 - 20)  SpO2: 95% (95% - 98%)    GEN: NAD, pleasant  HEENT: normocephalic and atraumatic. EOMI. LEORA...  NECK: Supple. No carotid bruits.  No lymphadenopathy or thyromegaly.  LUNGS: Clear to auscultation.  HEART: Regular rate and rhythm without murmur.  ABDOMEN: Soft, nontender, and nondistended.  Positive bowel sounds.  No hepatosplenomegaly was noted.  NO REBOUND NO GUARDING  EXTREMITIES: Without any cyanosis, clubbing, rash, lesions or edema.  NEUROLOGIC: Cranial nerves II through XII are grossly intact.    SKIN: No ulceration or induration present.      Labs:            PT/INR - ( 28 Apr 2017 06:26 )   PT: 20.6 sec;   INR: 1.85 ratio                   CAPILLARY BLOOD GLUCOSE        RECENT CULTURES:  04-26 .Urine Catheterized XXXX XXXX   10,000 - 49,000 CFU/mL Gram Negative Rods Identification and  susceptibility to follow.

## 2017-04-28 NOTE — PROGRESS NOTE ADULT - PROBLEM SELECTOR PLAN 3
Continue Flecainide, Metoprolol  Continue Coumadin 2.5 tonight Continue Metoprolol.  Add low dose Amlodipine  PRN Hydralazine

## 2017-04-28 NOTE — PROGRESS NOTE ADULT - ASSESSMENT
85 year old female with ESBL Klebsiella pneumonia UTI (Cystitis), without any evidence of sepsis or pyelonephritis. Given sensitivity patterns needs to be on IV antibiotics. urine culture obtained with GNR.  HTN  uncontrolled at admission - improved.  Chronic atrial fibrillation is rate controlled and INR therapeutic.  TSH - 4.2, normal. 85 year old female with ESBL Klebsiella pneumonia UTI (Cystitis), without any evidence of sepsis or pyelonephritis. Given sensitivity patterns needs to be on IV antibiotics. urine culture obtained with GNR. CT abdomen (04/28/17) without any evidence of stone or pyelonephritis. Does show biliary ductal dilatation and mass in area of pancreatic head.  HTN  uncontrolled at admission - improved.  Chronic atrial fibrillation is rate controlled and INR sub-therapeutic.  TSH - 4.2, normal.

## 2017-04-29 DIAGNOSIS — K80.20 CALCULUS OF GALLBLADDER WITHOUT CHOLECYSTITIS WITHOUT OBSTRUCTION: ICD-10-CM

## 2017-04-29 DIAGNOSIS — R74.8 ABNORMAL LEVELS OF OTHER SERUM ENZYMES: ICD-10-CM

## 2017-04-29 LAB
ALBUMIN SERPL ELPH-MCNC: 3.2 G/DL — LOW (ref 3.3–5.2)
ALP SERPL-CCNC: 656 U/L — HIGH (ref 40–120)
ALT FLD-CCNC: 108 U/L — HIGH
ANION GAP SERPL CALC-SCNC: 12 MMOL/L — SIGNIFICANT CHANGE UP (ref 5–17)
AST SERPL-CCNC: 130 U/L — HIGH
BILIRUB SERPL-MCNC: 2 MG/DL — SIGNIFICANT CHANGE UP (ref 0.4–2)
BUN SERPL-MCNC: 11 MG/DL — SIGNIFICANT CHANGE UP (ref 8–20)
CALCIUM SERPL-MCNC: 9.2 MG/DL — SIGNIFICANT CHANGE UP (ref 8.6–10.2)
CHLORIDE SERPL-SCNC: 99 MMOL/L — SIGNIFICANT CHANGE UP (ref 98–107)
CO2 SERPL-SCNC: 29 MMOL/L — SIGNIFICANT CHANGE UP (ref 22–29)
CREAT SERPL-MCNC: 0.47 MG/DL — LOW (ref 0.5–1.3)
GLUCOSE SERPL-MCNC: 94 MG/DL — SIGNIFICANT CHANGE UP (ref 70–115)
POTASSIUM SERPL-MCNC: 4 MMOL/L — SIGNIFICANT CHANGE UP (ref 3.5–5.3)
POTASSIUM SERPL-SCNC: 4 MMOL/L — SIGNIFICANT CHANGE UP (ref 3.5–5.3)
PROT SERPL-MCNC: 6.7 G/DL — SIGNIFICANT CHANGE UP (ref 6.6–8.7)
SODIUM SERPL-SCNC: 140 MMOL/L — SIGNIFICANT CHANGE UP (ref 135–145)

## 2017-04-29 PROCEDURE — 99232 SBSQ HOSP IP/OBS MODERATE 35: CPT

## 2017-04-29 RX ADMIN — Medication 1 TABLET(S): at 17:47

## 2017-04-29 RX ADMIN — Medication 50 MICROGRAM(S): at 05:21

## 2017-04-29 RX ADMIN — Medication 1 TABLET(S): at 11:53

## 2017-04-29 RX ADMIN — PANTOPRAZOLE SODIUM 40 MILLIGRAM(S): 20 TABLET, DELAYED RELEASE ORAL at 05:22

## 2017-04-29 RX ADMIN — Medication 1 TABLET(S): at 07:43

## 2017-04-29 RX ADMIN — AMLODIPINE BESYLATE 2.5 MILLIGRAM(S): 2.5 TABLET ORAL at 05:21

## 2017-04-29 RX ADMIN — Medication 50 MILLIGRAM(S): at 05:21

## 2017-04-29 RX ADMIN — Medication 50 MILLIGRAM(S): at 17:47

## 2017-04-29 RX ADMIN — ERTAPENEM SODIUM 120 MILLIGRAM(S): 1 INJECTION, POWDER, LYOPHILIZED, FOR SOLUTION INTRAMUSCULAR; INTRAVENOUS at 17:47

## 2017-04-29 RX ADMIN — Medication 10 MILLIGRAM(S): at 01:50

## 2017-04-29 NOTE — CONSULT NOTE ADULT - SUBJECTIVE AND OBJECTIVE BOX
85 year old female referred for pancreatic mass evaluation.  States admitted for UTI. Reports weight loss of about 30 lbs in past year due to "dieting" . Some postprandial upper abdominal discomfort with big or fatty meals.   Aware of a "pancreatic mass". Evaluated at [facility] in about 2008 with possible FNA/EUS. Confirmed per my discussion with patient's daughter. Daughter will provide further documentation.  PMH: PPM, A fib, Hypothyroidism  Allergies: NKDA  FSH: Denies smoking, significant alcohol  Medications reviewed. On coumadin  ROS: No acute cardiorespiratory complaints, change in bowel. Last colonoscopy about 3 years ago per patient.  Physical exam:  Elderly in NAD, alert and awake  Head: NC  Chest nontender with clear lungs and no rubs or gallops  Abdomen benign with no Dennison's sign  Extremities negative for cyanosis, edema.

## 2017-04-29 NOTE — CONSULT NOTE ADULT - PROBLEM SELECTOR PROBLEM 1
ESBL (extended spectrum beta-lactamase) producing bacteria infection
Pancreatic mass
Pancreatic mass

## 2017-04-29 NOTE — CONSULT NOTE ADULT - PROBLEM SELECTOR RECOMMENDATION 3
Clear to low fat diet as tolerated. Follow up sonogram GB and HIDA if abdominal pain, fever. Surgery follow up.

## 2017-04-29 NOTE — PROGRESS NOTE ADULT - ASSESSMENT
85 year old female with ESBL Klebsiella pneumonia UTI (Cystitis), without any evidence of sepsis or pyelonephritis. Given sensitivity patterns needs to be on IV antibiotics. Urine culture obtained- shows GNR. No bacteremia. Clinically improving.  CT abdomen (04/28/17) without any evidence of stone or pyelonephritis. Does show biliary ductal dilatation and mass in area of pancreatic head. Elevated transaminases as well as slightly elevated serum Lipase.   HTN  uncontrolled at admission - improved.  Chronic atrial fibrillation is rate controlled and INR sub-therapeutic.  Hypothyroidism, clinically stableTSH - 4.2, normal.

## 2017-04-29 NOTE — CONSULT NOTE ADULT - PROBLEM SELECTOR RECOMMENDATION 2
Follow LFT's. Not candidate for MRCP given h/o PPM. May need ERCP. Would hold coumadin pending potential GI intervention if cardiology agreeable.
as above  - encourage PO hydration.

## 2017-04-29 NOTE — PROGRESS NOTE ADULT - SUBJECTIVE AND OBJECTIVE BOX
HOSPITALIST PROGRESS NOTE    TABITHA HERMOSILLO  [id number]  85yFemale    Patient is a 85y old  Female who presents with a chief complaint of 'urinary tract infection' (26 Apr 2017 21:29)      SUBJECTIVE:   Chart reviewed since last visit.  Patient seen and examined at bedside earlier today for UTI, pancreatic mass.  Feels better - denies chills or fever.  Abdominal pain resolved - only hurt when i press.  Lower back pain also improved.  Denies any nausea, vomiting or upper abdominal pain.      OBJECTIVE:  Vital Signs Last 24 Hrs  T(C): 36.6, Max: 36.8 (04-29 @ 08:53)  T(F): 97.8, Max: 98.2 (04-29 @ 08:53)  HR: 67 (61 - 69)  BP: 165/72 (150/60 - 182/80)  RR: 18 (18 - 20)  SpO2: 97% (95% - 97%)    PHYSICAL EXAMINATION  General: NAD[+]   nontoxic[+]     HEENT: AT/NC[+]    NECK: Supple[+]  Carotid Bruit[+]  CVS:   Irregular[+]  S1+S2[+]   Murmur[+]  RESP: Fair air entry bilaterally[+]   Clear sounds[+]    GI: Soft[+]  Nondistended[+]   decreased Tenderness lower abdomen   Bowel Sounds[+]    : suprapubic tenderness[+]   CVA Tenderness[-]   Andrea[-]  MS: FROM[+]   Edema[-]  CNS: AAOx3[+]    No gross deficit appreciated  INTEG: Skin is Warm[+]  dry[+]   PSYCH: Fair Mood, Affect    I&O's Summary      PT/INR - ( 28 Apr 2017 06:26 )   PT: 20.6 sec;   INR: 1.85 ratio           04-29    140  |  99  |  11.0  ----------------------------<  94  4.0   |  29.0  |  0.47<L>    Ca    9.2      29 Apr 2017 07:59    TPro  6.7  /  Alb  3.2<L>  /  TBili  2.0  /  DBili  x   /  AST  130<H>  /  ALT  108<H>  /  AlkPhos  656<H>  04-29    Lipase, Serum (04.28.17 @ 17:54)    Lipase, Serum: 60 U/L      Culture - Urine (04.26.17 @ 15:50)    Specimen Source: .Urine Catheterized    Culture Results:   10,000 - 49,000 CFU/mL Gram Negative Rods Identification and  susceptibility to follow.    Culture - Blood (04.27.17 @ 04:08)    Specimen Source: .Blood Blood-Peripheral    Culture Results:   No growth at 48 hours        MEDICATIONS  (STANDING):  levothyroxine 50MICROGram(s) Oral daily  pantoprazole    Tablet 40milliGRAM(s) Oral before breakfast  multivitamin 1Tablet(s) Oral daily  flecainide 50milliGRAM(s) Oral two times a day  lactobacillus acidophilus 1Tablet(s) Oral two times a day with meals  metoprolol succinate ER 50milliGRAM(s) Oral daily  ertapenem  IVPB 1000milliGRAM(s) IV Intermittent every 24 hours  amLODIPine   Tablet 2.5milliGRAM(s) Oral daily      MEDICATIONS  (PRN):  acetaminophen   Tablet 650milliGRAM(s) Oral every 6 hours PRN For Temp greater than 38 C (100.4 F)  hydrALAZINE Injectable 10milliGRAM(s) IV Push every 6 hours PRN SBP>180, DBP>95

## 2017-04-29 NOTE — PROGRESS NOTE ADULT - SUBJECTIVE AND OBJECTIVE BOX
SUBJECTIVE:  Pt seen and examined. No overnight events. PT states she feels better. Afebrile. Tolerating regular diet     MEDICATIONS  (STANDING):  levothyroxine 50MICROGram(s) Oral daily  pantoprazole    Tablet 40milliGRAM(s) Oral before breakfast  multivitamin 1Tablet(s) Oral daily  flecainide 50milliGRAM(s) Oral two times a day  lactobacillus acidophilus 1Tablet(s) Oral two times a day with meals  metoprolol succinate ER 50milliGRAM(s) Oral daily  ertapenem  IVPB 1000milliGRAM(s) IV Intermittent every 24 hours  amLODIPine   Tablet 2.5milliGRAM(s) Oral daily    MEDICATIONS  (PRN):  acetaminophen   Tablet 650milliGRAM(s) Oral every 6 hours PRN For Temp greater than 38 C (100.4 F)  hydrALAZINE Injectable 10milliGRAM(s) IV Push every 6 hours PRN SBP>180, DBP>95      Vital Signs Last 24 Hrs  T(C): 36.8, Max: 36.8 (04-29 @ 08:53)  T(F): 98.2, Max: 98.2 (04-29 @ 08:53)  HR: 62 (61 - 71)  BP: 150/60 (143/64 - 182/80)  BP(mean): --  RR: 18 (18 - 20)  SpO2: 95% (95% - 97%)    PHYSICAL EXAM:      GENERAL: NAD, A&Ox3  HEAD:  Atraumatic, Normocephalic  EYES: EOMI, PERRLA, conjunctiva and sclera clear  ENMT: No tonsillar erythema, exudates, or enlargement; Moist mucous membranes, Good dentition, No lesions  NECK: Supple, No JVD, Normal thyroid  CHEST/LUNG: Clear to percussion bilaterally; No rales, rhonchi, wheezing, or rubs  HEART: Regular rate and rhythm; No murmurs, rubs, or gallops  ABDOMEN: Soft, TTP of the lower quadrants  Nondistended; Bowel sounds present  EXTREMITIES:  2+ Peripheral Pulses, No clubbing, cyanosis, or edema  LYMPH: No lymphadenopathy noted  SKIN: No rashes or lesions    I&O's Detail      LABS:    04-29    140  |  99  |  11.0  ----------------------------<  94  4.0   |  29.0  |  0.47<L>    Ca    9.2      29 Apr 2017 07:59    TPro  6.7  /  Alb  3.2<L>  /  TBili  2.0  /  DBili  x   /  AST  130<H>  /  ALT  108<H>  /  AlkPhos  656<H>  04-29    PT/INR - ( 28 Apr 2017 06:26 )   PT: 20.6 sec;   INR: 1.85 ratio               RADIOLOGY & ADDITIONAL STUDIES:

## 2017-04-29 NOTE — PROGRESS NOTE ADULT - PROBLEM SELECTOR PLAN 2
Pancreatic CT - cannot get MRI secondary to PPM  GI consult appreciated  CA -19-9  Surgery Consult - [place]

## 2017-04-29 NOTE — CONSULT NOTE ADULT - PROBLEM SELECTOR RECOMMENDATION 9
Repeat EUS +/- ERCP to be considered after daughter provides details of prior evaluation in 2008 which was reported as negative. All potential pathologies discussed. ERCP and EUS discussed. Daughter RN and aware of potential risks, benefits.
1. Cont care per primary team  2. To follow up labs  3. Case was discussed with attending  4. No surgical intervention at this time  5. Surgery to continue to follow
Ertapenem  x 5 days  - follow up on urine cx here and on CT of the abd / pelvis w/o and w/ contrast

## 2017-04-30 LAB
-  AMIKACIN: SIGNIFICANT CHANGE UP
-  AMPICILLIN/SULBACTAM: SIGNIFICANT CHANGE UP
-  AMPICILLIN: SIGNIFICANT CHANGE UP
-  AZTREONAM: SIGNIFICANT CHANGE UP
-  CEFAZOLIN: SIGNIFICANT CHANGE UP
-  CEFEPIME: SIGNIFICANT CHANGE UP
-  CEFOXITIN: SIGNIFICANT CHANGE UP
-  CEFTAZIDIME: SIGNIFICANT CHANGE UP
-  CEFTRIAXONE: SIGNIFICANT CHANGE UP
-  CIPROFLOXACIN: SIGNIFICANT CHANGE UP
-  ERTAPENEM: SIGNIFICANT CHANGE UP
-  GENTAMICIN: SIGNIFICANT CHANGE UP
-  IMIPENEM: SIGNIFICANT CHANGE UP
-  LEVOFLOXACIN: SIGNIFICANT CHANGE UP
-  MEROPENEM: SIGNIFICANT CHANGE UP
-  NITROFURANTOIN: SIGNIFICANT CHANGE UP
-  PIPERACILLIN/TAZOBACTAM: SIGNIFICANT CHANGE UP
-  TOBRAMYCIN: SIGNIFICANT CHANGE UP
-  TRIMETHOPRIM/SULFAMETHOXAZOLE: SIGNIFICANT CHANGE UP
CANCER AG19-9 SERPL-ACNC: 158 U/ML — HIGH
CULTURE RESULTS: SIGNIFICANT CHANGE UP
INR BLD: 2.34 RATIO — HIGH (ref 0.88–1.16)
METHOD TYPE: SIGNIFICANT CHANGE UP
ORGANISM # SPEC MICROSCOPIC CNT: SIGNIFICANT CHANGE UP
ORGANISM # SPEC MICROSCOPIC CNT: SIGNIFICANT CHANGE UP
PROTHROM AB SERPL-ACNC: 26.2 SEC — HIGH (ref 9.8–12.7)
SPECIMEN SOURCE: SIGNIFICANT CHANGE UP

## 2017-04-30 PROCEDURE — 99232 SBSQ HOSP IP/OBS MODERATE 35: CPT

## 2017-04-30 PROCEDURE — 74160 CT ABDOMEN W/CONTRAST: CPT | Mod: 26

## 2017-04-30 RX ORDER — AMLODIPINE BESYLATE 2.5 MG/1
5 TABLET ORAL DAILY
Qty: 0 | Refills: 0 | Status: DISCONTINUED | OUTPATIENT
Start: 2017-04-30 | End: 2017-05-02

## 2017-04-30 RX ADMIN — Medication 50 MILLIGRAM(S): at 05:32

## 2017-04-30 RX ADMIN — AMLODIPINE BESYLATE 5 MILLIGRAM(S): 2.5 TABLET ORAL at 18:09

## 2017-04-30 RX ADMIN — PANTOPRAZOLE SODIUM 40 MILLIGRAM(S): 20 TABLET, DELAYED RELEASE ORAL at 05:32

## 2017-04-30 RX ADMIN — Medication 50 MILLIGRAM(S): at 18:09

## 2017-04-30 RX ADMIN — ERTAPENEM SODIUM 120 MILLIGRAM(S): 1 INJECTION, POWDER, LYOPHILIZED, FOR SOLUTION INTRAMUSCULAR; INTRAVENOUS at 18:09

## 2017-04-30 RX ADMIN — Medication 50 MICROGRAM(S): at 05:32

## 2017-04-30 RX ADMIN — AMLODIPINE BESYLATE 2.5 MILLIGRAM(S): 2.5 TABLET ORAL at 05:32

## 2017-04-30 RX ADMIN — Medication 1 TABLET(S): at 18:09

## 2017-04-30 NOTE — PROGRESS NOTE ADULT - ASSESSMENT
PT AWAKE ALERT WITH HX RECURRENT UTI  CONTINUE IV ABX INVANZ TO COMPLETE 7 DAYS WILL FOLLOW UP AS NEEDED

## 2017-04-30 NOTE — PROGRESS NOTE ADULT - PROBLEM SELECTOR PLAN 2
Pancreatic CT - cannot get MRI secondary to PPM  GI consult appreciated  CA -19-9  Surgery Consult - [place]

## 2017-04-30 NOTE — PROGRESS NOTE ADULT - SUBJECTIVE AND OBJECTIVE BOX
TABITHA HERMOSILLO is a 85y Female with HPI:  85 year old female with PMHx HTN, Chronic atrial fibrillation s/p PPM and UTI presents with dysuria and chills. Denies any fever, but does have urinary incontinence as well and occasional lower abdominal and lower back pain. Denies any headaches but gets occasional dizziness. She also gets sore throat attributable to post nasal drip.  She was treated for a UTI with oral antibiotics recently and was told to go to the ER today because of resistant bacteria. Outpatient urine culture with >100k CFU ESBL Klebsiella pneumonia sensitive only to Carbapenems, Aminoglycosides and Zosyn.  Denies any history of nephrolithiasis, but does get recurrent UTIs.  PT  AFEBRILE    She lives independently and uses a walker. (26 Apr 2017 21:29)        Allergies:  No Known Allergies      Medications:  acetaminophen   Tablet 650milliGRAM(s) Oral every 6 hours PRN  levothyroxine 50MICROGram(s) Oral daily  pantoprazole    Tablet 40milliGRAM(s) Oral before breakfast  multivitamin 1Tablet(s) Oral daily  flecainide 50milliGRAM(s) Oral two times a day  lactobacillus acidophilus 1Tablet(s) Oral two times a day with meals  metoprolol succinate ER 50milliGRAM(s) Oral daily  hydrALAZINE Injectable 10milliGRAM(s) IV Push every 6 hours PRN  ertapenem  IVPB 1000milliGRAM(s) IV Intermittent every 24 hours  amLODIPine   Tablet 2.5milliGRAM(s) Oral daily  warfarin 5milliGRAM(s) Oral once      ANTIBIOTICS:    INVANZ     Review of Systems: - Negative except as mentioned above     Physical Exam:  ICU Vital Signs Last 24 Hrs  T(C): 36.3, Max: 36.8 (04-27 @ 20:10)  T(F): 97.4, Max: 98.3 (04-27 @ 20:10)  HR: 71 (65 - 73)  BP: 166/80 (143/64 - 191/89)  BP(mean): --  ABP: --  ABP(mean): --  RR: 20 (18 - 20)  SpO2: 95% (95% - 98%)    GEN: NAD, pleasant  HEENT: normocephalic and atraumatic. EOMI. LEORA...  NECK: Supple. No carotid bruits.  No lymphadenopathy or thyromegaly.  LUNGS: Clear to auscultation.  HEART: Regular rate and rhythm without murmur.  ABDOMEN: Soft, nontender, and nondistended.  Positive bowel sounds.  No hepatosplenomegaly was noted.  NO REBOUND NO GUARDING  EXTREMITIES: Without any cyanosis, clubbing, rash, lesions or edema.  NEUROLOGIC: Cranial nerves II through XII are grossly intact.    SKIN: No ulceration or induration present.      Labs:            PT/INR - ( 28 Apr 2017 06:26 )   PT: 20.6 sec;   INR: 1.85 ratio                   CAPILLARY BLOOD GLUCOSE        RECENT CULTURES:  04-26 .Urine Catheterized XXXX XXXX   10,000 - 49,000 CFU/mL Gram Negative Rods Identification and  susceptibility to follow.

## 2017-04-30 NOTE — PROGRESS NOTE ADULT - SUBJECTIVE AND OBJECTIVE BOX
HOSPITALIST PROGRESS NOTE    TABITHA JAIMEE  [id number]  85yFemale    Patient is a 85y old  Female who presents with a chief complaint of 'urinary tract infection' (26 Apr 2017 21:29)      SUBJECTIVE:   Chart reviewed since last visit.  Patient seen and examined at bedside earlier today.  Lower abdominal pain resolved.  Denies any dysuria, chills or fevers.  Denies upper abdominal pain, nausea or vomiting.      OBJECTIVE:  Vital Signs Last 24 Hrs  T(C): 36.4, Max: 36.6 (04-29 @ 15:35)  T(F): 97.5, Max: 97.8 (04-29 @ 15:35)  HR: 66 (65 - 67)  BP: 151/82 (151/82 - 165/72)  RR: 18 (18 - 18)  SpO2: 96% (96% - 97%)    PHYSICAL EXAMINATION  General: NAD[+]   nontoxic[+]     HEENT: AT/NC[+]    NECK: Supple[+]  Carotid Bruit[+]  CVS:   Irregular[+]  S1+S2[+]   Murmur[+]  RESP: Fair air entry bilaterally[+]   Clear sounds[+]    GI: Soft[+]  Nondistended[+]   Minimal Tenderness lower abdomen   Bowel Sounds[+]    : suprapubic tenderness[+/-]   CVA Tenderness[-]   Andrea[-]  MS: FROM[+]   Edema[-]  CNS: AAOx3[+]    No gross deficit appreciated  INTEG: Skin is Warm[+]  dry[+]   PSYCH: Fair Mood, Affect        PT/INR - ( 30 Apr 2017 07:54 )   PT: 26.2 sec;   INR: 2.34 ratio         Labs- Pending        Culture:    Culture - Urine (04.26.17 @ 15:50)    -  Amikacin: S <=16    -  Cefoxitin: S <=8    -  Ceftazidime: R >16    -  Gentamicin: S <=4    -  Piperacillin/Tazobactam: R <=16    -  Trimethoprim/Sulfamethoxazole: R >2/38    -  Ampicillin: R >16    -  Cefepime: R >16    -  Ciprofloxacin: R >2    -  Ampicillin/Sulbactam: R >16/8    -  Nitrofurantoin: R >64    -  Aztreonam: R >16    -  Cefazolin: R >16    -  Ceftriaxone: R >32    -  Ertapenem: S <=1    -  Imipenem: S <=1    -  Levofloxacin: R >4    -  Meropenem: S <=1    -  Tobramycin: S <=4    Specimen Source: .Urine Catheterized    Culture Results:   10,000 - 49,000 CFU/mL Klebsiella pneumoniae ESBL  .  TYPE: (C=Critical, N=Notification, A=Abnormal) C  TESTS:  _ ESBL  DATE/TIME CALLED: _ 04/30/2017 09:55  CALLED TO: Josefina HickeyTWR: Fabi Jackson RN  READ BACK (2 Patient Identifiers)(Y/N): _ Y  READ BACK VALUES (Y/N): _ Y  CALLED BY: Josefina cadena  .  Copy to Infection Control, 04/30/2017 09:55    Organism Identification: Klebsiella pneumoniae ESBL    Organism: Klebsiella pneumoniae ESBL    Method Type: ADRIANA      Culture - Blood (04.27.17 @ 04:08)    Specimen Source: .Blood Blood-Peripheral    Culture Results:   No growth at 48 hours      RADIOLOGY  Pending Pancreatic CT        MEDICATIONS  (STANDING):  levothyroxine 50MICROGram(s) Oral daily  pantoprazole    Tablet 40milliGRAM(s) Oral before breakfast  multivitamin 1Tablet(s) Oral daily  flecainide 50milliGRAM(s) Oral two times a day  lactobacillus acidophilus 1Tablet(s) Oral two times a day with meals  metoprolol succinate ER 50milliGRAM(s) Oral daily  ertapenem  IVPB 1000milliGRAM(s) IV Intermittent every 24 hours  amLODIPine   Tablet 2.5milliGRAM(s) Oral daily      MEDICATIONS  (PRN):  acetaminophen   Tablet 650milliGRAM(s) Oral every 6 hours PRN For Temp greater than 38 C (100.4 F)  hydrALAZINE Injectable 10milliGRAM(s) IV Push every 6 hours PRN SBP>180, DBP>95

## 2017-05-01 PROCEDURE — 93306 TTE W/DOPPLER COMPLETE: CPT | Mod: 26

## 2017-05-01 PROCEDURE — 99233 SBSQ HOSP IP/OBS HIGH 50: CPT

## 2017-05-01 RX ADMIN — PANTOPRAZOLE SODIUM 40 MILLIGRAM(S): 20 TABLET, DELAYED RELEASE ORAL at 06:12

## 2017-05-01 RX ADMIN — Medication 50 MILLIGRAM(S): at 06:12

## 2017-05-01 RX ADMIN — Medication 50 MICROGRAM(S): at 06:12

## 2017-05-01 RX ADMIN — ERTAPENEM SODIUM 120 MILLIGRAM(S): 1 INJECTION, POWDER, LYOPHILIZED, FOR SOLUTION INTRAMUSCULAR; INTRAVENOUS at 17:28

## 2017-05-01 RX ADMIN — Medication 1 TABLET(S): at 09:17

## 2017-05-01 RX ADMIN — Medication 1 TABLET(S): at 17:29

## 2017-05-01 RX ADMIN — AMLODIPINE BESYLATE 5 MILLIGRAM(S): 2.5 TABLET ORAL at 06:12

## 2017-05-01 RX ADMIN — Medication 50 MILLIGRAM(S): at 17:29

## 2017-05-01 NOTE — PROGRESS NOTE ADULT - ASSESSMENT
85 F with h/o A-fib (was on coumadin) who was admitted with weight loss and painless jaundice. CT abdomen showed 3 cms pancreatic uncinate mass with upstream CBD dilation and involvement of SV/PVC.    Plan:  monitor LFTs and INR  EUS-FNA and ERCP tomorrow if INR 1.5 or less (to give 2 units FFP today)  d/w medicine team  message left for her daughter (Ms Perry [phone])

## 2017-05-01 NOTE — PROGRESS NOTE ADULT - SUBJECTIVE AND OBJECTIVE BOX
Patient is a 85y old  Female who presents with a chief complaint of 'urinary tract infection' (26 Apr 2017 21:29)  She is scheduled to have an ERCP & EUS.    PAST MEDICAL HISTORY:  Cardiac Pacemaker  Atrial Fibrillation x 15 years   Hypothyroid  Hypercholesteremia  GERD (Gastroesophageal Reflux Disease)  HTN - Hypertension  Pacemaker placed 6 years ago    PAST SURGICAL HISTORY:  S/P lumpectomy, left breast with benign tissue  S/P tonsillectomy as a child  History of pacemaker  H/O abdominal hysterectomy      MEDICATIONS  (STANDING):  levothyroxine 50MICROGram(s) Oral daily  pantoprazole    Tablet 40milliGRAM(s) Oral before breakfast  multivitamin 1Tablet(s) Oral daily  flecainide 50milliGRAM(s) Oral two times a day  lactobacillus acidophilus 1Tablet(s) Oral two times a day with meals  metoprolol succinate ER 50milliGRAM(s) Oral daily  amLODIPine   Tablet 5milliGRAM(s) Oral daily    MEDICATIONS  (PRN):  acetaminophen   Tablet 650milliGRAM(s) Oral every 6 hours PRN For Temp greater than 38 C (100.4 F)  hydrALAZINE Injectable 10milliGRAM(s) IV Push every 6 hours PRN SBP>180, DBP>95      Allergies:  No Known Drug Allergies      SOCIAL HISTORY:  The patient says that she drinks wine once in a while but does not smoke or use                                illicit drugs.    PT/INR - ( 01 May 2017 07:28 )   PT: 25.6 sec;   INR: 2.29 ratio         PTT - ( 26 Apr 2017 15:36 )  PTT:51.3 sec    05-01    137  |  97<L>  |  13.0  ----------------------------<  93  4.8   |  26.0  |  0.41<L>    Ca    9.0      01 May 2017 07:28    TPro  7.0  /  Alb  3.1<L>  /  TBili  4.5<H>  /  DBili  x   /  AST  460<H>  /  ALT  329<H>  /  AlkPhos  819<H>  05-01    EKG:  10/11/2016  Junctional tachycardia  Non-specific intra-ventricular conduction block  T wave abnormality, consider inferior ischemia  Abnormal ECG    TT ECHO:   None     CHEST P.A. LATERAL   -   10/11/2016  IMPRESSION: Support Devices: Cardiac pacer wires are seen.  Heart: Normal in size  Mediastinum: Normal in contour  Lungs/Airways: No consolidation or pleural effusion.  Musculoskeletal: Thoracic compression fracture status post vertebroplasty    CT ABDOMEN  -  04/30/2017    Impression:  Intrahepatic and extrahepatic biliary ductal dilatation seen to the level   of an approximate 3 cm ill-defined mass head of pancreas. Dilated   gallbladder.  Renal cysts. Approximate 1.6 cm exophytic hyperdense cyst versus solid   lesion upper pole right kidney.    Culture Results:   10,000 - 49,000 CFU/mL Klebsiella pneumoniae ESBL  .  TYPE: (C=Critical, N=Notification, A=Abnormal) C  TESTS:  _ ESBL  DATE/TIME CALLED: _ 04/30/2017 09:55  CALLED TO: _ 6TWR: Fabi Jackson RN  READ BACK (2 Patient Identifiers)(Y/N): _ Y  READ BACK VALUES (Y/N): _ Y  CALLED BY: Josefina cadena  .  Copy to Infection Control, 04/30/2017 09:55 (04.26.17 @ 15:50)    ASA # =  3    Mallampati # =  2   (The patient has an upper removable denture.  Her lower teeth are not loose.)

## 2017-05-01 NOTE — PROGRESS NOTE ADULT - SUBJECTIVE AND OBJECTIVE BOX
INTERVAL HPI/OVERNIGHT EVENTS:  Patient seen and examined    MEDICATIONS  (STANDING):  levothyroxine 50MICROGram(s) Oral daily  pantoprazole    Tablet 40milliGRAM(s) Oral before breakfast  multivitamin 1Tablet(s) Oral daily  flecainide 50milliGRAM(s) Oral two times a day  lactobacillus acidophilus 1Tablet(s) Oral two times a day with meals  metoprolol succinate ER 50milliGRAM(s) Oral daily  ertapenem  IVPB 1000milliGRAM(s) IV Intermittent every 24 hours  amLODIPine   Tablet 5milliGRAM(s) Oral daily    MEDICATIONS  (PRN):  acetaminophen   Tablet 650milliGRAM(s) Oral every 6 hours PRN For Temp greater than 38 C (100.4 F)  hydrALAZINE Injectable 10milliGRAM(s) IV Push every 6 hours PRN SBP>180, DBP>95      Allergies    No Known Allergies    Intolerances        Vital Signs Last 24 Hrs  T(C): 36.8, Max: 36.8 (05-01 @ 09:04)  T(F): 98.2, Max: 98.2 (05-01 @ 09:04)  HR: 66 (59 - 70)  BP: 133/76 (133/76 - 180/85)  BP(mean): --  RR: 18 (18 - 18)  SpO2: 97% (96% - 97%)    PHYSICAL EXAM:  General: NAD.  CVS: S1, S2  Chest: air entry bilaterally present  Abd: BS present, soft, non-tender      LABS:    05-01    137  |  97<L>  |  13.0  ----------------------------<  93  4.8   |  26.0  |  0.41<L>    Ca    9.0      01 May 2017 07:28    TPro  7.0  /  Alb  3.1<L>  /  TBili  4.5<H>  /  DBili  x   /  AST  460<H>  /  ALT  329<H>  /  AlkPhos  819<H>  05-01    PT/INR - ( 01 May 2017 07:28 )   PT: 25.6 sec;   INR: 2.29 ratio       (previous CT abdomen 03/15/17 reviewed- 2 cms pan uncinate mass. as per daughter not, she had EUS in 2008 at [facility])

## 2017-05-01 NOTE — PROGRESS NOTE ADULT - SUBJECTIVE AND OBJECTIVE BOX
Patient seen and examined at bedside. No acute events overnight. Patient states she has minimal epigastric abdominal tenderness.    Vital Signs Last 24 Hrs  T(C): 36.4, Max: 36.8 (04-29 @ 08:53)  T(F): 97.5, Max: 98.2 (04-29 @ 08:53)  HR: 66 (62 - 67)  BP: 151/82 (150/60 - 169/85)  BP(mean): --  RR: 18 (18 - 18)  SpO2: 96% (95% - 97%)      PHYSICAL EXAM:  Constitutional: Patient well nourish. well developed.  Eyes:  PERRL, EOMI, Conjunctiva clear.  ENMT:  WNL  Neck:  Supple.  Respiratory:  Lungs CTA, B/L, no rales , no wheezing, no rhonchi.  Cardiovascular:  S1, S2, RRR  Gastrointestinal: Abdomen soft, non distended, + BS, minimal epigastric tenderness  Extremities:  No edema, no calf tenderrness,  Neurological: AxAxOx3    MEDICATIONS  (STANDING):  levothyroxine 50MICROGram(s) Oral daily  pantoprazole    Tablet 40milliGRAM(s) Oral before breakfast  multivitamin 1Tablet(s) Oral daily  flecainide 50milliGRAM(s) Oral two times a day  lactobacillus acidophilus 1Tablet(s) Oral two times a day with meals  metoprolol succinate ER 50milliGRAM(s) Oral daily  ertapenem  IVPB 1000milliGRAM(s) IV Intermittent every 24 hours  amLODIPine   Tablet 2.5milliGRAM(s) Oral daily    MEDICATIONS  (PRN):  acetaminophen   Tablet 650milliGRAM(s) Oral every 6 hours PRN For Temp greater than 38 C (100.4 F)  hydrALAZINE Injectable 10milliGRAM(s) IV Push every 6 hours PRN SBP>180, DBP>95      LABS:    04-29    140  |  99  |  11.0  ----------------------------<  94  4.0   |  29.0  |  0.47<L>    Ca    9.2      29 Apr 2017 07:59    TPro  6.7  /  Alb  3.2<L>  /  TBili  2.0  /  DBili  x   /  AST  130<H>  /  ALT  108<H>  /  AlkPhos  656<H>  04-29    PT/INR - ( 30 Apr 2017 07:54 )   PT: 26.2 sec;   INR: 2.34 ratio

## 2017-05-01 NOTE — CONSULT NOTE ADULT - ASSESSMENT
1. Painless jaundice with CBD obstruction and pancreatic mass-point to a malignancy. For ERCP tomorrow. No cardiac contraindications as long as inr is acceptably low.  2. paroxysmal a-fib-rate controlled.  3. murmur of aortic sclerosis vs stenosis. Doubt hemodynamically significant given exam findings.Would obtain echo this admission.  4. pacemaker  5. hypothyroidism  6. hypertension-controlled.

## 2017-05-01 NOTE — PROGRESS NOTE ADULT - PROBLEM SELECTOR PLAN 1
- Will follow GI plans reg possible EUS  - Rest of care per primary team  -will continue to follow for further surgical planning

## 2017-05-01 NOTE — CONSULT NOTE ADULT - SUBJECTIVE AND OBJECTIVE BOX
Chief Complaint: pre-op clearance.    HPI: old records reviewed. 86 yo F originally admitted with an EBSL UTI w Klebsiella. Had abnl abd ct and painless jaundice. Found to have elevated lft's and increased BR. CT showed head of pancreas mass and CBD obstruction. PMH + for paroxysmal afib (on AC) hypothyroidism/hypertension and pacemaker. She has also had a benign lumpectomy/kyphosis surgery/hysterectomy and tonsillectomy. PMH- for MI/chest pain/cva/syncope/seizure/pulmonary/renal/hepatic or heme hx. No allergy/smoking or drinking hx. Lives alone and ambulates w walker. Still drives.  in a NH. ROS neg for edema/orthopnea scruggs/pnd. OP meds include synthroid/toprol 25/ vesicare/coumadin    PAST MEDICAL & SURGICAL HISTORY:  Cardiac Pacemaker  Cardiac Pacemaker  Atrial Fibrillation  Hypothyroid  Hypercholesteremia  GERD (Gastroesophageal Reflux Disease)  HTN - Hypertension  S/P lumpectomy, left breast  S/P tonsillectomy  History of pacemaker  H/O abdominal hysterectomy      PREVIOUS DIAGNOSTIC TESTING:      ECHO  FINDINGS:    STRESS  FINDINGS:    CATHETERIZATION  FINDINGS:    MEDICATIONS  (STANDING):  levothyroxine 50MICROGram(s) Oral daily  pantoprazole    Tablet 40milliGRAM(s) Oral before breakfast  multivitamin 1Tablet(s) Oral daily  flecainide 50milliGRAM(s) Oral two times a day  lactobacillus acidophilus 1Tablet(s) Oral two times a day with meals  metoprolol succinate ER 50milliGRAM(s) Oral daily  ertapenem  IVPB 1000milliGRAM(s) IV Intermittent every 24 hours  amLODIPine   Tablet 5milliGRAM(s) Oral daily    MEDICATIONS  (PRN):  acetaminophen   Tablet 650milliGRAM(s) Oral every 6 hours PRN For Temp greater than 38 C (100.4 F)  hydrALAZINE Injectable 10milliGRAM(s) IV Push every 6 hours PRN SBP>180, DBP>95      FAMILY HISTORY:  No pertinent family history in first degree relatives      SOCIAL HISTORY:    CIGARETTES: 0    ALCOHOL: 0    ROS: Negative other than as mentioned in HPI.    Vital Signs Last 24 Hrs  T(C): 36.8, Max: 36.8 (05-01 @ 09:04)  T(F): 98.2, Max: 98.2 (05-01 @ 09:04)  HR: 66 (59 - 70)  BP: 140/80 ra manually (133/76 - 180/85)  BP(mean): --  RR: 14 non-labored (18 - 18)  SpO2: 97% (96% - 97%)    PHYSICAL EXAM:  General: Appears well developed, well nourished alert and cooperative. Elderly alert afebrile obese F  HEENT: Head; normocephalic, atraumatic.  Eyes;   Pupils reactive, cornea wnl. +scleral icterus  Neck; Supple, no nodes adenopathy, no NVD. referred systolic  bruits from cardiac base No thyromegaly.  CARDIOVASCULAR; 2-3/6 systolic murmur at base radiating to neck. S2 + but diminished., No rub, gallop or lift. Normal S1.  LUNGS; No rales, rhonchi or wheeze. Normal breath sounds bilaterally.  ABDOMEN ; Soft, nontender without mass or organomegaly. bowel sounds normoactive.  EXTREMITIES; No clubbing, cyanosis or edema. Distal pulses wnl. ROM normal.  SKIN; warm and dry with normal turgor. Generalized jaundice  NEURO; Alert/oriented x 3/normal motor exam. No pathologic reflexes.    PSYCH; normal affect.            INTERPRETATION OF TELEMETRY:    ECG:     I&O's Detail      LABS:    05-01    137  |  97<L>  |  13.0  ----------------------------<  93  4.8   |  26.0  |  0.41<L>    Ca    9.0      01 May 2017 07:28    TPro  7.0  /  Alb  3.1<L>  /  TBili  4.5<H>  /  DBili  x   /  AST  460<H>  /  ALT  329<H>  /  AlkPhos  819<H>  05-01        PT/INR - ( 01 May 2017 07:28 )   PT: 25.6 sec;   INR: 2.29 ratio             I&O's Summary      RADIOLOGY & ADDITIONAL STUDIES: Chief Complaint: pre-op clearance.    HPI: old records reviewed. 86 yo F originally admitted with an EBSL UTI w Klebsiella. Had abnl abd ct and painless jaundice. Found to have elevated lft's and increased BR. CT showed head of pancreas mass and CBD obstruction. PMH + for paroxysmal afib (on AC) hypothyroidism/hypertension and pacemaker. She has also had a benign lumpectomy/kyphosis surgery/hysterectomy and tonsillectomy. PMH- for MI/chest pain/cva/syncope/seizure/pulmonary/renal/hepatic or heme hx. No allergy/smoking or drinking hx. Lives alone and ambulates w walker. Still drives.  in a NH. ROS neg for edema/orthopnea scruggs/pnd. Pt reports a 30 lb weight loss over past year. OP meds include synthroid/toprol 25/ vesicare/coumadin    PAST MEDICAL & SURGICAL HISTORY:  Cardiac Pacemaker  Cardiac Pacemaker  Atrial Fibrillation  Hypothyroid  Hypercholesteremia  GERD (Gastroesophageal Reflux Disease)  HTN - Hypertension  S/P lumpectomy, left breast  S/P tonsillectomy  History of pacemaker  H/O abdominal hysterectomy      PREVIOUS DIAGNOSTIC TESTING:      ECHO  FINDINGS:    STRESS  FINDINGS:    CATHETERIZATION  FINDINGS:    MEDICATIONS  (STANDING):  levothyroxine 50MICROGram(s) Oral daily  pantoprazole    Tablet 40milliGRAM(s) Oral before breakfast  multivitamin 1Tablet(s) Oral daily  flecainide 50milliGRAM(s) Oral two times a day  lactobacillus acidophilus 1Tablet(s) Oral two times a day with meals  metoprolol succinate ER 50milliGRAM(s) Oral daily  ertapenem  IVPB 1000milliGRAM(s) IV Intermittent every 24 hours  amLODIPine   Tablet 5milliGRAM(s) Oral daily    MEDICATIONS  (PRN):  acetaminophen   Tablet 650milliGRAM(s) Oral every 6 hours PRN For Temp greater than 38 C (100.4 F)  hydrALAZINE Injectable 10milliGRAM(s) IV Push every 6 hours PRN SBP>180, DBP>95      FAMILY HISTORY:  No pertinent family history in first degree relatives      SOCIAL HISTORY:    CIGARETTES: 0    ALCOHOL: 0    ROS: Negative other than as mentioned in HPI.    Vital Signs Last 24 Hrs  T(C): 36.8, Max: 36.8 (05-01 @ 09:04)  T(F): 98.2, Max: 98.2 (05-01 @ 09:04)  HR: 66 (59 - 70)  BP: 140/80 ra manually (133/76 - 180/85)  BP(mean): --  RR: 14 non-labored (18 - 18)  SpO2: 97% (96% - 97%)    PHYSICAL EXAM:  General: Appears well developed, well nourished alert and cooperative. Elderly alert afebrile obese F  HEENT: Head; normocephalic, atraumatic.  Eyes;   Pupils reactive, cornea wnl. +scleral icterus  Neck; Supple, no nodes adenopathy, no NVD. referred systolic  bruits from cardiac base No thyromegaly.  CARDIOVASCULAR; 2-3/6 systolic murmur at base radiating to neck. S2 + but diminished., No rub, gallop or lift. Normal S1.  LUNGS; No rales, rhonchi or wheeze. Normal breath sounds bilaterally.  ABDOMEN ; Soft, nontender without mass or organomegaly. bowel sounds normoactive.  EXTREMITIES; No clubbing, cyanosis or edema. Distal pulses wnl. ROM normal.  SKIN; warm and dry with normal turgor. Generalized jaundice  NEURO; Alert/oriented x 3/normal motor exam. No pathologic reflexes.    PSYCH; normal affect.            INTERPRETATION OF TELEMETRY:    ECG: none this adm but ekg of 1/17 shows SR w first degree avb and lae.    I&O's Detail      LABS:    05-01    137  |  97<L>  |  13.0  ----------------------------<  93  4.8   |  26.0  |  0.41<L>    Ca    9.0      01 May 2017 07:28    TPro  7.0  /  Alb  3.1<L>  /  TBili  4.5<H>  /  DBili  x   /  AST  460<H>  /  ALT  329<H>  /  AlkPhos  819<H>  05-01        PT/INR - ( 01 May 2017 07:28 )   PT: 25.6 sec;   INR: 2.29 ratio             I&O's Summary      RADIOLOGY & ADDITIONAL STUDIES:

## 2017-05-01 NOTE — PROGRESS NOTE ADULT - SUBJECTIVE AND OBJECTIVE BOX
HOSPITALIST PROGRESS NOTE    TABITHA HERMOSILLO  [id number]  85yFemale    Patient is a 85y old  Female who presents with a chief complaint of 'urinary tract infection' (26 Apr 2017 21:29)      SUBJECTIVE:   Chart reviewed since last visit.  Patient seen and examined at bedside earlier today.  Continues to feel better.   Denies any abdominal pain, dysuria or chills.  Denies any nausea or vomiting.  Urine is dark coloured      OBJECTIVE:  Vital Signs Last 24 Hrs  T(C): 36.8, Max: 36.8 (05-01 @ 09:04)  T(F): 98.2, Max: 98.2 (05-01 @ 09:04)  HR: 66 (59 - 70)  BP: 133/76 (133/76 - 180/85)  RR: 18 (18 - 18)  SpO2: 97% (96% - 97%)    PHYSICAL EXAMINATION  General: NAD[+]   nontoxic[+]     HEENT: AT/NC[+]    NECK: Supple[+]  Carotid Bruit[+]  CVS:   Irregular[+]  S1+S2[+]   Murmur[+]  RESP: Fair air entry bilaterally[+]   Clear sounds[+]    GI: Soft[+]  Nondistended[+]   Minimally Tenderness lower abdomen  - improved  Bowel Sounds[+]    : suprapubic tenderness[+/-]   CVA Tenderness[-]   Nadrea[-]  MS: FROM[+]   Edema[-]  CNS: AAOx3[+]    No gross deficit appreciated  INTEG: Skin is Warm[+]  dry[+]   PSYCH: Fair Mood, Affect        I&O's Summary      PT/INR - ( 01 May 2017 07:28 )   PT: 25.6 sec;   INR: 2.29 ratio           05-01    137  |  97<L>  |  13.0  ----------------------------<  93  4.8   |  26.0  |  0.41<L>    Ca    9.0      01 May 2017 07:28    TPro  7.0  /  Alb  3.1<L>  /  TBili  4.5<H>  /  DBili  x   /  AST  460<H>  /  ALT  329<H>  /  AlkPhos  819<H>  05-01        EXAM:  CT ABDOMEN ONLY OC IC                          PROCEDURE DATE:  04/30/2017        INTERPRETATION:  History: Evaluate pancreatic mass.    Findings: Pancreatic protocol helical CT scans through the abdomen was   performed following intravenous contrast injection of 97 cc Omnipaque   350. Sagittal and coronal reformat images are also provided.    Correlation is made with prior noncontrast CT abdomen/28/2017.    Intrahepatic and extrahepatic biliary ductal dilatation seen to the level   of an approximate 3 cm ill-defined mass head of the pancreas. The body   and tail of the pancreas is unremarkable. No significant dilatation of   the pancreatic duct. The fluid-filled gallbladder is dilated.    Several small renal cysts. There is an approximate 1.6 cm exophytic cyst   medial upper pole right kidney which measures above fluid by Hounsfield   units. This may represent a hyperdense cyst or solid lesion.    The visualized lung bases are unremarkable.    Impression:    Intrahepatic and extrahepatic biliary ductal dilatation seen to the level   of an approximate 3 cm ill-defined mass head of pancreas. Dilated   gallbladder.    Renal cysts. Approximate 1.6 cm exophytic hyperdense cyst versus solid   lesion upper pole right kidney.                JASMIN BROWN M.D. ATTENDING RADIOLOGIST  This document has been electronically signed. Apr 30 2017  5:35PM      MEDICATIONS  (STANDING):  levothyroxine 50MICROGram(s) Oral daily  pantoprazole    Tablet 40milliGRAM(s) Oral before breakfast  multivitamin 1Tablet(s) Oral daily  flecainide 50milliGRAM(s) Oral two times a day  lactobacillus acidophilus 1Tablet(s) Oral two times a day with meals  metoprolol succinate ER 50milliGRAM(s) Oral daily  ertapenem  IVPB 1000milliGRAM(s) IV Intermittent every 24 hours  amLODIPine   Tablet 5milliGRAM(s) Oral daily      MEDICATIONS  (PRN):  acetaminophen   Tablet 650milliGRAM(s) Oral every 6 hours PRN For Temp greater than 38 C (100.4 F)  hydrALAZINE Injectable 10milliGRAM(s) IV Push every 6 hours PRN SBP>180, DBP>95

## 2017-05-01 NOTE — PROGRESS NOTE ADULT - ASSESSMENT
85 year old female with ESBL Klebsiella pneumonia UTI (Cystitis), without any evidence of sepsis or pyelonephritis. Given sensitivity patterns needs to be on IV antibiotics. Urine culture obtained- shows GNR. No bacteremia. Clinically improving.  CT abdomen (04/28/17) without any evidence of stone or pyelonephritis. Does show biliary ductal dilatation and mass in area of pancreatic head. Elevated transaminases as well as slightly elevated serum Lipase. Worsening LFT now with jaundice.  HTN  uncontrolled at admission - improved.  Chronic atrial fibrillation is rate controlled and INR therapeutic, despite Coumadin on hold.  Hypothyroidism, clinically stable. TSH - 4.2, normal.

## 2017-05-02 LAB
ALBUMIN SERPL ELPH-MCNC: 3.2 G/DL — LOW (ref 3.3–5.2)
ALP SERPL-CCNC: 734 U/L — HIGH (ref 40–120)
ALT FLD-CCNC: 346 U/L — HIGH
ANION GAP SERPL CALC-SCNC: 11 MMOL/L — SIGNIFICANT CHANGE UP (ref 5–17)
APTT BLD: 42.2 SEC — HIGH (ref 27.5–37.4)
AST SERPL-CCNC: 471 U/L — HIGH
BILIRUB SERPL-MCNC: 4.9 MG/DL — HIGH (ref 0.4–2)
BUN SERPL-MCNC: 14 MG/DL — SIGNIFICANT CHANGE UP (ref 8–20)
CALCIUM SERPL-MCNC: 9.1 MG/DL — SIGNIFICANT CHANGE UP (ref 8.6–10.2)
CHLORIDE SERPL-SCNC: 98 MMOL/L — SIGNIFICANT CHANGE UP (ref 98–107)
CO2 SERPL-SCNC: 30 MMOL/L — HIGH (ref 22–29)
CREAT SERPL-MCNC: 0.46 MG/DL — LOW (ref 0.5–1.3)
GLUCOSE SERPL-MCNC: 90 MG/DL — SIGNIFICANT CHANGE UP (ref 70–115)
INR BLD: 1.93 RATIO — HIGH (ref 0.88–1.16)
POTASSIUM SERPL-MCNC: 4 MMOL/L — SIGNIFICANT CHANGE UP (ref 3.5–5.3)
POTASSIUM SERPL-SCNC: 4 MMOL/L — SIGNIFICANT CHANGE UP (ref 3.5–5.3)
PROT SERPL-MCNC: 6.5 G/DL — LOW (ref 6.6–8.7)
PROTHROM AB SERPL-ACNC: 21.5 SEC — HIGH (ref 9.8–12.7)
SODIUM SERPL-SCNC: 139 MMOL/L — SIGNIFICANT CHANGE UP (ref 135–145)

## 2017-05-02 PROCEDURE — 99233 SBSQ HOSP IP/OBS HIGH 50: CPT

## 2017-05-02 RX ORDER — AMLODIPINE BESYLATE 2.5 MG/1
10 TABLET ORAL DAILY
Qty: 0 | Refills: 0 | Status: DISCONTINUED | OUTPATIENT
Start: 2017-05-02 | End: 2017-05-09

## 2017-05-02 RX ADMIN — Medication 50 MILLIGRAM(S): at 06:47

## 2017-05-02 RX ADMIN — PANTOPRAZOLE SODIUM 40 MILLIGRAM(S): 20 TABLET, DELAYED RELEASE ORAL at 06:47

## 2017-05-02 RX ADMIN — Medication 50 MICROGRAM(S): at 06:47

## 2017-05-02 RX ADMIN — Medication 50 MILLIGRAM(S): at 18:04

## 2017-05-02 RX ADMIN — Medication 1 TABLET(S): at 11:19

## 2017-05-02 RX ADMIN — Medication 1 TABLET(S): at 18:04

## 2017-05-02 RX ADMIN — AMLODIPINE BESYLATE 5 MILLIGRAM(S): 2.5 TABLET ORAL at 06:47

## 2017-05-02 NOTE — DIETITIAN INITIAL EVALUATION ADULT. - OTHER INFO
Pt reports fair po intake at meals- agreeable to try Ensure supplements when po intake is less.  Pt with intentional wt loss over the past year, and reports she has been maintaining wt more recently.

## 2017-05-02 NOTE — PROGRESS NOTE ADULT - SUBJECTIVE AND OBJECTIVE BOX
HOSPITALIST PROGRESS NOTE    TABITHA JAIMEE  [id number]  85yFemale    Patient is a 85y old  Female who presents with a chief complaint of 'urinary tract infection' (26 Apr 2017 21:29)      SUBJECTIVE:   Chart reviewed since last visit.  Patient seen and examined at bedside earlier today for UTI, obstructive jaundice.  Denies any abdominal pain, fever, chills, nausea or vomiting.        OBJECTIVE:  Vital Signs Last 24 Hrs  T(C): 37.2, Max: 37.2 (05-02 @ 08:01)  T(F): 99, Max: 99 (05-02 @ 08:01)  HR: 66 (62 - 66)  BP: 137/72 (137/72 - 161/77)  RR: 18 (18 - 18)  SpO2: 95% (95% - 95%)    PHYSICAL EXAMINATION  General: NAD[+]   nontoxic[+]     HEENT: AT/NC[+]    NECK: Supple[+]  Carotid Bruit[+]  CVS:   Irregular[+]  S1+S2[+]   Murmur[+]  RESP: Fair air entry bilaterally[+]   Clear sounds[+]    GI: Soft[+]  Nondistended[+]   Minimally Tenderness lower abdomen  - improved  Bowel Sounds[+]    : suprapubic tenderness[+/-]   CVA Tenderness[-]   Andrea[-]  MS: FROM[+]   Edema[-]  CNS: AAOx3[+]    No gross deficit appreciated  INTEG: Skin is Warm[+]  dry[+]   PSYCH: Fair Mood, Affect    I&O's Summary      PT/INR - ( 02 May 2017 06:54 )   PT: 21.5 sec;   INR: 1.93 ratio         PTT - ( 02 May 2017 06:54 )  PTT:42.2 sec  05-02    139  |  98  |  14.0  ----------------------------<  90  4.0   |  30.0<H>  |  0.46<L>    Ca    9.1      02 May 2017 06:54    TPro  6.5<L>  /  Alb  3.2<L>  /  TBili  4.9<H>  /  DBili  x   /  AST  471<H>  /  ALT  346<H>  /  AlkPhos  734<H>  05-02      Cancer Antigen, GI Ca 19-9 (04.29.17 @ 23:17)    Cancer Antigen, GI Ca 19-9: 158.0:       TTE:  EXAM:  ECHO TRANSTHORACIC COMP W DOPP      PROCEDURE DATE:  May  1 2017    Summary:   1. Mildly enlarged left atrium.   2. There is mild concentric left ventricular hypertrophy.   3. Normal wall motion. Left ventricular ejection fraction, by visual   estimation, is 60 to 65%. Grade I diastolic dysfunction.   4. The right atrium is normal in size.   5. Normal right ventricular size and function.   6. Moderate aortic valve stenosis.   7. There is no evidence of pericardial effusion.     MD Leonor Electronically signed on 5/1/2017 at 9:03:40 PM          MEDICATIONS  (STANDING):  levothyroxine 50MICROGram(s) Oral daily  pantoprazole    Tablet 40milliGRAM(s) Oral before breakfast  multivitamin 1Tablet(s) Oral daily  flecainide 50milliGRAM(s) Oral two times a day  lactobacillus acidophilus 1Tablet(s) Oral two times a day with meals  metoprolol succinate ER 50milliGRAM(s) Oral daily  amLODIPine   Tablet 5milliGRAM(s) Oral daily      MEDICATIONS  (PRN):  acetaminophen   Tablet 650milliGRAM(s) Oral every 6 hours PRN For Temp greater than 38 C (100.4 F)  hydrALAZINE Injectable 10milliGRAM(s) IV Push every 6 hours PRN SBP>180, DBP>95

## 2017-05-02 NOTE — PROGRESS NOTE ADULT - ASSESSMENT
85 F with h/o A-fib (was on coumadin) who was admitted with weight loss and painless jaundice. CT abdomen showed 3 cms pancreatic uncinate mass with upstream CBD dilation and involvement of SV/PVC. INR today is 1.9 (improving, but still high).    Plan:  monitor LFTs and INR  EUS-FNA and ERCP rescheduled tomorrow (need INR 1.5 or less)  will f/u

## 2017-05-02 NOTE — PROGRESS NOTE ADULT - SUBJECTIVE AND OBJECTIVE BOX
[place] [facility] - [facility], THE HEART CENTER                                   [address] [place] [address]                                                      PHONE: [phone]                                                         FAX: [phone]  [email or web address]  ---------------------------------------------------------------------------------------------------------------------------------    Overnight events/patient complaints:  NAD awaiting ERCP     No Known Allergies    MEDICATIONS  (STANDING):  levothyroxine 50MICROGram(s) Oral daily  pantoprazole    Tablet 40milliGRAM(s) Oral before breakfast  multivitamin 1Tablet(s) Oral daily  flecainide 50milliGRAM(s) Oral two times a day  lactobacillus acidophilus 1Tablet(s) Oral two times a day with meals  metoprolol succinate ER 50milliGRAM(s) Oral daily  amLODIPine   Tablet 5milliGRAM(s) Oral daily    MEDICATIONS  (PRN):  acetaminophen   Tablet 650milliGRAM(s) Oral every 6 hours PRN For Temp greater than 38 C (100.4 F)  hydrALAZINE Injectable 10milliGRAM(s) IV Push every 6 hours PRN SBP>180, DBP>95      Vital Signs Last 24 Hrs  T(C): 37.2, Max: 37.2 (05-02 @ 08:01)  T(F): 99, Max: 99 (05-02 @ 08:01)  HR: 66 (62 - 66)  BP: 137/72 (137/72 - 161/77)  BP(mean): --  RR: 18 (18 - 18)  SpO2: 95% (95% - 95%)  ICU Vital Signs Last 24 Hrs  TABITHA HERMOSILLO  I&O's Detail    I&O's Summary    Drug Dosing Weight  TABITHA HERMOSILLO      PHYSICAL EXAM:  General: Appears well developed, well nourished alert and cooperative.  HEENT: Head; normocephalic, atraumatic.  Eyes: Pupils reactive, cornea wnl.  Neck: Supple, no nodes adenopathy, no NVD or carotid bruit or thyromegaly.  CARDIOVASCULAR: Normal S1 and S2,  murmur, rub, gallop or lift.   LUNGS: No rales, rhonchi or wheeze. nor/m mid peaking AS murmur  breath sounds bilaterally.  ABDOMEN: Soft, nontender without mass or organomegaly. bowel sounds normoactive.  EXTREMITIES: No clubbing, cyanosis or edema. Distal pulses wnl.   SKIN: warm and dry with normal turgor.  NEURO: Alert/oriented x 3/normal motor exam. No pathologic reflexes.    PSYCH: normal affect.        LABS:    05-02    139  |  98  |  14.0  ----------------------------<  90  4.0   |  30.0<H>  |  0.46<L>    Ca    9.1      02 May 2017 06:54    TPro  6.5<L>  /  Alb  3.2<L>  /  TBili  4.9<H>  /  DBili  x   /  AST  471<H>  /  ALT  346<H>  /  AlkPhos  734<H>  05-02    TABITHA HERMOSILLO      PT/INR - ( 02 May 2017 06:54 )   PT: 21.5 sec;   INR: 1.93 ratio         PTT - ( 02 May 2017 06:54 )  PTT:42.2 sec      RADIOLOGY & ADDITIONAL STUDIES:    INTERPRETATION OF TELEMETRY (personally reviewed):    ECG:    ECHO:   Summary:   1. Mildly enlarged left atrium.   2. There is mild concentric left ventricular hypertrophy.   3. Normal wall motion. Left ventricular ejection fraction, by visual   estimation, is 60 to 65%. Grade I diastolic dysfunction.   4. The right atrium is normal in size.   5. Normal right ventricular size and function.   6. Moderate aortic valve stenosis.   7. There is no evidence of pericardial effusion.      ASSESSMENT AND PLAN:  In summary, TAIBTHA HERMOSILLO is an 85y Female with past medical history significant for SSS s/p PPM PAF on AC normal EF moderate AS awaiting ERCP pancreatitic mass low risk procedure in which patient has a moderate CV risk; no active CV contraindications to GI procedure

## 2017-05-02 NOTE — PROGRESS NOTE ADULT - ASSESSMENT
85 year old female with ESBL Klebsiella pneumonia UTI (Cystitis), without any evidence of sepsis or pyelonephritis. Given sensitivity patterns needs to be on IV antibiotics. Urine culture obtained- shows GNR. No bacteremia. Clinically improving. Completed 7 days IV Invanz.    CT abdomen (04/28/17) without any evidence of stone or pyelonephritis. Does show biliary ductal dilatation and mass in area of pancreatic head. Elevated transaminases as well as slightly elevated serum Lipase. Worsening LFT now with jaundice. Plan for ERCP once INR allows.    HTN  uncontrolled at admission - improved.    Chronic atrial fibrillation is rate controlled and INR downtrending    Hypothyroidism, clinically stable. TSH - 4.2, normal. 85 year old female with ESBL Klebsiella pneumonia UTI (Cystitis), without any evidence of sepsis or pyelonephritis. Given sensitivity patterns needs to be on IV antibiotics. Urine culture obtained- shows GNR. No bacteremia. Clinically improving. Completed 7 days IV Invanz.    CT abdomen (04/28/17) without any evidence of stone or pyelonephritis. Does show biliary ductal dilatation and mass in area of pancreatic head. Elevated transaminases as well as slightly elevated serum Lipase. Worsening LFT now with jaundice. Elevated CA 19.9 158 concerning for malignancy. Plan for ERCP once INR allows.    HTN  uncontrolled at admission - improved.    Chronic atrial fibrillation is rate controlled and INR downtrending. TTE shows moderate AS.    Hypothyroidism, clinically stable. TSH - 4.2, normal.

## 2017-05-02 NOTE — PROGRESS NOTE ADULT - SUBJECTIVE AND OBJECTIVE BOX
INTERVAL HPI/OVERNIGHT EVENTS:  Patient seen and examined    MEDICATIONS  (STANDING):  levothyroxine 50MICROGram(s) Oral daily  pantoprazole    Tablet 40milliGRAM(s) Oral before breakfast  multivitamin 1Tablet(s) Oral daily  flecainide 50milliGRAM(s) Oral two times a day  lactobacillus acidophilus 1Tablet(s) Oral two times a day with meals  metoprolol succinate ER 50milliGRAM(s) Oral daily  amLODIPine   Tablet 5milliGRAM(s) Oral daily    MEDICATIONS  (PRN):  acetaminophen   Tablet 650milliGRAM(s) Oral every 6 hours PRN For Temp greater than 38 C (100.4 F)  hydrALAZINE Injectable 10milliGRAM(s) IV Push every 6 hours PRN SBP>180, DBP>95      Allergies    No Known Allergies    Intolerances        Vital Signs Last 24 Hrs  T(C): 37.2, Max: 37.2 (05-02 @ 08:01)  T(F): 99, Max: 99 (05-02 @ 08:01)  HR: 66 (62 - 66)  BP: 137/72 (137/72 - 161/77)  BP(mean): --  RR: 18 (18 - 18)  SpO2: 95% (95% - 95%)    PHYSICAL EXAM:  General: NAD.  CVS: S1, S2  Chest: air entry bilaterally present  Abd: BS present, soft, non-tender      LABS:    05-02    139  |  98  |  14.0  ----------------------------<  90  4.0   |  30.0<H>  |  0.46<L>    Ca    9.1      02 May 2017 06:54    TPro  6.5<L>  /  Alb  3.2<L>  /  TBili  4.9<H>  /  DBili  x   /  AST  471<H>  /  ALT  346<H>  /  AlkPhos  734<H>  05-02    PT/INR - ( 02 May 2017 06:54 )   PT: 21.5 sec;   INR: 1.93 ratio         PTT - ( 02 May 2017 06:54 )  PTT:42.2 sec

## 2017-05-03 LAB
ALBUMIN SERPL ELPH-MCNC: 3.2 G/DL — LOW (ref 3.3–5.2)
ALP SERPL-CCNC: 807 U/L — HIGH (ref 40–120)
ALT FLD-CCNC: 397 U/L — HIGH
ANION GAP SERPL CALC-SCNC: 13 MMOL/L — SIGNIFICANT CHANGE UP (ref 5–17)
AST SERPL-CCNC: 486 U/L — HIGH
BILIRUB SERPL-MCNC: 6.8 MG/DL — HIGH (ref 0.4–2)
BUN SERPL-MCNC: 13 MG/DL — SIGNIFICANT CHANGE UP (ref 8–20)
CALCIUM SERPL-MCNC: 9.1 MG/DL — SIGNIFICANT CHANGE UP (ref 8.6–10.2)
CHLORIDE SERPL-SCNC: 97 MMOL/L — LOW (ref 98–107)
CO2 SERPL-SCNC: 30 MMOL/L — HIGH (ref 22–29)
CREAT SERPL-MCNC: 0.51 MG/DL — SIGNIFICANT CHANGE UP (ref 0.5–1.3)
CULTURE RESULTS: SIGNIFICANT CHANGE UP
CULTURE RESULTS: SIGNIFICANT CHANGE UP
GLUCOSE SERPL-MCNC: 100 MG/DL — SIGNIFICANT CHANGE UP (ref 70–115)
INR BLD: 1.53 RATIO — HIGH (ref 0.88–1.16)
INR BLD: 1.59 RATIO — HIGH (ref 0.88–1.16)
POTASSIUM SERPL-MCNC: 3.9 MMOL/L — SIGNIFICANT CHANGE UP (ref 3.5–5.3)
POTASSIUM SERPL-SCNC: 3.9 MMOL/L — SIGNIFICANT CHANGE UP (ref 3.5–5.3)
PROT SERPL-MCNC: 6.8 G/DL — SIGNIFICANT CHANGE UP (ref 6.6–8.7)
PROTHROM AB SERPL-ACNC: 17 SEC — HIGH (ref 9.8–12.7)
PROTHROM AB SERPL-ACNC: 17.6 SEC — HIGH (ref 9.8–12.7)
SODIUM SERPL-SCNC: 140 MMOL/L — SIGNIFICANT CHANGE UP (ref 135–145)
SPECIMEN SOURCE: SIGNIFICANT CHANGE UP
SPECIMEN SOURCE: SIGNIFICANT CHANGE UP

## 2017-05-03 PROCEDURE — 99233 SBSQ HOSP IP/OBS HIGH 50: CPT

## 2017-05-03 RX ORDER — MULTIVIT-MIN/FERROUS GLUCONATE 9 MG/15 ML
2 LIQUID (ML) ORAL
Qty: 0 | Refills: 0 | COMMUNITY

## 2017-05-03 RX ORDER — WARFARIN SODIUM 2.5 MG/1
1 TABLET ORAL
Qty: 0 | Refills: 0 | COMMUNITY

## 2017-05-03 RX ADMIN — PANTOPRAZOLE SODIUM 40 MILLIGRAM(S): 20 TABLET, DELAYED RELEASE ORAL at 06:32

## 2017-05-03 RX ADMIN — AMLODIPINE BESYLATE 10 MILLIGRAM(S): 2.5 TABLET ORAL at 06:32

## 2017-05-03 RX ADMIN — Medication 50 MILLIGRAM(S): at 18:32

## 2017-05-03 RX ADMIN — Medication 50 MILLIGRAM(S): at 06:32

## 2017-05-03 RX ADMIN — Medication 50 MICROGRAM(S): at 06:32

## 2017-05-03 RX ADMIN — Medication 1 TABLET(S): at 18:32

## 2017-05-03 NOTE — PROGRESS NOTE ADULT - ASSESSMENT
85 F with h/o A-fib (was on coumadin) who was admitted with weight loss and painless jaundice. CT abdomen showed 3 cms pancreatic uncinate mass with upstream CBD dilation and involvement of SV/PVC. INR today 1.59, repeat 1.53. d/w anesthesia team.    Plan:  monitor LFTs and INR  EUS-FNA and ERCP rescheduled tomorrow (need INR 1.5 or less)  d/w medicine team

## 2017-05-03 NOTE — PROGRESS NOTE ADULT - SUBJECTIVE AND OBJECTIVE BOX
HOSPITALIST PROGRESS NOTE    TABITHA HERMOSILLO  [id number]  85yFemale    Patient is a 85y old  Female who presents with a chief complaint of 'urinary tract infection' (26 Apr 2017 21:29)      SUBJECTIVE:   Chart reviewed since last visit.  Patient seen and examined at bedside earlier today for UTI, elevated LFTs.  'I'm jaundiced'  Denies any abdominal pain, dysuria, fevers or chills.  Denies any nausea or vomiting.      OBJECTIVE:  Vital Signs Last 24 Hrs  T(C): 36.9, Max: 36.9 (05-03 @ 07:44)  T(F): 98.4, Max: 98.4 (05-03 @ 07:44)  HR: 63 (59 - 65)  BP: 154/77 (144/73 - 154/80)  RR: 18 (18 - 18)  SpO2: 98% (97% - 99%)    PHYSICAL EXAMINATION  General: NAD[+]   nontoxic[+]     HEENT: AT/NC[+]    NECK: Supple[+]  Carotid Bruit[+]  CVS:   Irregular[+]  S1+S2[+]   Murmur[+]  RESP: Fair air entry bilaterally[+]   Clear sounds[+]    GI: Soft[+]  Nondistended[+]   Minimally Tenderness lower abdomen  - improved  Bowel Sounds[+]    : suprapubic tenderness[+/-]   CVA Tenderness[-]   Andrea[-]  MS: FROM[+]   Edema[-]  CNS: AAOx3[+]    No gross deficit appreciated  INTEG: Skin is Warm[+]  dry[+] Jaundiced  PSYCH: Fair Mood, Affect  MONITOR:  CAPILLARY BLOOD GLUCOSE        I&O's Summary      PT/INR - ( 03 May 2017 12:35 )   PT: 17.0 sec;   INR: 1.53 ratio         PTT - ( 02 May 2017 06:54 )  PTT:42.2 sec  05-03    140  |  97<L>  |  13.0  ----------------------------<  100  3.9   |  30.0<H>  |  0.51    Ca    9.1      03 May 2017 07:12    TPro  6.8  /  Alb  3.2<L>  /  TBili  6.8<H>  /  DBili  x   /  AST  486<H>  /  ALT  397<H>  /  AlkPhos  807<H>  05-03          MEDICATIONS  (STANDING):  levothyroxine 50MICROGram(s) Oral daily  pantoprazole    Tablet 40milliGRAM(s) Oral before breakfast  multivitamin 1Tablet(s) Oral daily  flecainide 50milliGRAM(s) Oral two times a day  lactobacillus acidophilus 1Tablet(s) Oral two times a day with meals  metoprolol succinate ER 50milliGRAM(s) Oral daily  amLODIPine   Tablet 10milliGRAM(s) Oral daily      MEDICATIONS  (PRN):  acetaminophen   Tablet 650milliGRAM(s) Oral every 6 hours PRN For Temp greater than 38 C (100.4 F)  hydrALAZINE Injectable 10milliGRAM(s) IV Push every 6 hours PRN SBP>180, DBP>95

## 2017-05-03 NOTE — PROGRESS NOTE ADULT - SUBJECTIVE AND OBJECTIVE BOX
INTERVAL HPI/OVERNIGHT EVENTS:  Patient seen and examined    MEDICATIONS  (STANDING):  levothyroxine 50MICROGram(s) Oral daily  pantoprazole    Tablet 40milliGRAM(s) Oral before breakfast  multivitamin 1Tablet(s) Oral daily  flecainide 50milliGRAM(s) Oral two times a day  lactobacillus acidophilus 1Tablet(s) Oral two times a day with meals  metoprolol succinate ER 50milliGRAM(s) Oral daily  amLODIPine   Tablet 10milliGRAM(s) Oral daily    MEDICATIONS  (PRN):  acetaminophen   Tablet 650milliGRAM(s) Oral every 6 hours PRN For Temp greater than 38 C (100.4 F)  hydrALAZINE Injectable 10milliGRAM(s) IV Push every 6 hours PRN SBP>180, DBP>95      Allergies    No Known Allergies    Intolerances        Vital Signs Last 24 Hrs  T(C): 36.6, Max: 36.9 (05-03 @ 07:44)  T(F): 97.8, Max: 98.4 (05-03 @ 07:44)  HR: 63 (59 - 63)  BP: 161/80 (144/73 - 161/80)  BP(mean): --  RR: 18 (18 - 18)  SpO2: 96% (96% - 98%)    PHYSICAL EXAM:  General: NAD.  CVS: S1, S2  Chest: air entry bilaterally present  Abd: BS present, soft, non-tender      LABS:    05-03    140  |  97<L>  |  13.0  ----------------------------<  100  3.9   |  30.0<H>  |  0.51    Ca    9.1      03 May 2017 07:12    TPro  6.8  /  Alb  3.2<L>  /  TBili  6.8<H>  /  DBili  x   /  AST  486<H>  /  ALT  397<H>  /  AlkPhos  807<H>  05-03    PT/INR - ( 03 May 2017 12:35 )   PT: 17.0 sec;   INR: 1.53 ratio         PTT - ( 02 May 2017 06:54 )  PTT:42.2 sec

## 2017-05-03 NOTE — PROGRESS NOTE ADULT - ASSESSMENT
85 year old female with ESBL Klebsiella pneumonia UTI (Cystitis), without any evidence of sepsis or pyelonephritis. Given sensitivity patterns needs to be on IV antibiotics. Urine culture obtained- shows GNR. No bacteremia. Clinically improving. Completed 7 days IV Invanz. Asymptomatic.    CT abdomen (04/28/17) without any evidence of stone or pyelonephritis. Does show biliary ductal dilatation and mass in area of pancreatic head. Elevated transaminases as well as slightly elevated serum Lipase. Worsening LFT now with jaundice. Elevated CA 19.9 158 concerning for malignancy. Plan for ERCP once INR allows.    HTN  uncontrolled at admission - improved.    Chronic atrial fibrillation is rate controlled and INR downtrending. TTE shows moderate AS.    Hypothyroidism, clinically stable. TSH - 4.2, normal.

## 2017-05-04 ENCOUNTER — RESULT REVIEW (OUTPATIENT)
Age: 82
End: 2017-05-04

## 2017-05-04 ENCOUNTER — TRANSCRIPTION ENCOUNTER (OUTPATIENT)
Age: 82
End: 2017-05-04

## 2017-05-04 LAB
ALLERGY+IMMUNOLOGY DIAG STUDY NOTE: SIGNIFICANT CHANGE UP
BLD GP AB SCN SERPL QL: ABNORMAL
DIR ANTIGLOB POLYSPECIFIC INTERPRETATION: SIGNIFICANT CHANGE UP
INR BLD: 1.42 RATIO — HIGH (ref 0.88–1.16)
PROTHROM AB SERPL-ACNC: 15.7 SEC — HIGH (ref 9.8–12.7)
TYPE + AB SCN PNL BLD: SIGNIFICANT CHANGE UP

## 2017-05-04 PROCEDURE — 88305 TISSUE EXAM BY PATHOLOGIST: CPT | Mod: 26

## 2017-05-04 PROCEDURE — 88172 CYTP DX EVAL FNA 1ST EA SITE: CPT | Mod: 26,59

## 2017-05-04 PROCEDURE — 88305 TISSUE EXAM BY PATHOLOGIST: CPT | Mod: 26,59

## 2017-05-04 PROCEDURE — 86077 PHYS BLOOD BANK SERV XMATCH: CPT

## 2017-05-04 PROCEDURE — 99233 SBSQ HOSP IP/OBS HIGH 50: CPT

## 2017-05-04 PROCEDURE — 88173 CYTOPATH EVAL FNA REPORT: CPT | Mod: 26

## 2017-05-04 RX ORDER — IBUPROFEN 200 MG
400 TABLET ORAL EVERY 6 HOURS
Qty: 0 | Refills: 0 | Status: DISCONTINUED | OUTPATIENT
Start: 2017-05-04 | End: 2017-05-09

## 2017-05-04 RX ADMIN — Medication 50 MICROGRAM(S): at 06:24

## 2017-05-04 RX ADMIN — Medication 50 MILLIGRAM(S): at 06:24

## 2017-05-04 RX ADMIN — AMLODIPINE BESYLATE 10 MILLIGRAM(S): 2.5 TABLET ORAL at 06:24

## 2017-05-04 RX ADMIN — Medication 1 TABLET(S): at 17:28

## 2017-05-04 RX ADMIN — PANTOPRAZOLE SODIUM 40 MILLIGRAM(S): 20 TABLET, DELAYED RELEASE ORAL at 06:24

## 2017-05-04 RX ADMIN — Medication 50 MILLIGRAM(S): at 17:28

## 2017-05-04 NOTE — PROGRESS NOTE ADULT - SUBJECTIVE AND OBJECTIVE BOX
[place] [facility] - [facility], THE HEART CENTER                                   [address] [place] [address]                                                      PHONE: [phone]                                                         FAX: [phone]  [email or web address]  ---------------------------------------------------------------------------------------------------------------------------------    Overnight events/patient complaints:   No events, awaiting ERCP    No Known Allergies    MEDICATIONS  (STANDING):  levothyroxine 50MICROGram(s) Oral daily  pantoprazole    Tablet 40milliGRAM(s) Oral before breakfast  multivitamin 1Tablet(s) Oral daily  flecainide 50milliGRAM(s) Oral two times a day  lactobacillus acidophilus 1Tablet(s) Oral two times a day with meals  metoprolol succinate ER 50milliGRAM(s) Oral daily  amLODIPine   Tablet 10milliGRAM(s) Oral daily  levoFLOXacin IVPB 500milliGRAM(s) IV Intermittent every 24 hours    MEDICATIONS  (PRN):  acetaminophen   Tablet 650milliGRAM(s) Oral every 6 hours PRN For Temp greater than 38 C (100.4 F)  hydrALAZINE Injectable 10milliGRAM(s) IV Push every 6 hours PRN SBP>180, DBP>95      Vital Signs Last 24 Hrs  T(C): 36.7, Max: 36.7 (05-04 @ 00:20)  T(F): 98.1, Max: 98.1 (05-04 @ 00:20)  HR: 56 (56 - 79)  BP: 127/68 (127/68 - 161/80)  BP(mean): --  RR: 18 (16 - 18)  SpO2: 95% (95% - 98%)  ICU Vital Signs Last 24 Hrs  TABITHA HERMOSILLO  I&O's Detail    I & Os for current day (as of 04 May 2017 10:22)  =============================================  IN:    Total IN: 0 ml  ---------------------------------------------  OUT:    Total OUT: 0 ml  ---------------------------------------------  Total NET: 0 ml    Drug Dosing Weight  TABITHA JAIMEE      PHYSICAL EXAM:  General: Appears well developed, well nourished alert and cooperative.  HEENT: Head; normocephalic, atraumatic.  Eyes: Pupils reactive, cornea wnl.  Neck: Supple, no nodes adenopathy, no NVD or carotid bruit or thyromegaly.  CARDIOVASCULAR: 2/6 systolic murmur, rub, gallop or lift.   LUNGS: No rales, rhonchi or wheeze. Normal breath sounds bilaterally.  ABDOMEN: Soft, nontender without mass or organomegaly. bowel sounds normoactive.  EXTREMITIES: No clubbing, cyanosis or edema. Distal pulses wnl.   SKIN: warm and dry with normal turgor.  NEURO: Alert/oriented x 3/normal motor exam. No pathologic reflexes.    PSYCH: normal affect.        LABS:    05-03    140  |  97<L>  |  13.0  ----------------------------<  100  3.9   |  30.0<H>  |  0.51    Ca    9.1      03 May 2017 07:12    TPro  6.8  /  Alb  3.2<L>  /  TBili  6.8<H>  /  DBili  x   /  AST  486<H>  /  ALT  397<H>  /  AlkPhos  807<H>  05-03    TABITHA HERMOSILLO      PT/INR - ( 04 May 2017 07:14 )   PT: 15.7 sec;   INR: 1.42 ratio               RADIOLOGY & ADDITIONAL STUDIES:              ECHO:    Summary:   1. Mildly enlarged left atrium.   2. There is mild concentric left ventricular hypertrophy.   3. Normal wall motion. Left ventricular ejection fraction, by visual   estimation, is 60 to 65%. Grade I diastolic dysfunction.   4. The right atrium is normal in size.   5. Normal right ventricular size and function.   6. Moderate aortic valve stenosis.   7. There is no evidence of pericardial effusion.     MD Leonor Electronically signed on 5/1/2017 at 9:03:40 PM         ASSESSMENT AND PLAN:  In summary, TABITHA HERMOSILLO is an 85y Female with past medical history significant for SSS s/p PPM PAF on AC normal EF moderate AS awaiting ERCP pancreatitic mass.    1) BP controlled  2) Pt awaiting ERCP  3) Coumadin on hold to be restarted when OK with GI  4) Please recall with any questions or concerns

## 2017-05-04 NOTE — PROGRESS NOTE ADULT - PROBLEM SELECTOR PLAN 4
Continue Flecainide, Metoprolol  Hold Coumadin for possible ERCP, EUS, Biopsy  Resume after Procedures/interventions complete

## 2017-05-04 NOTE — PROGRESS NOTE ADULT - SUBJECTIVE AND OBJECTIVE BOX
HOSPITALIST PROGRESS NOTE    TABITHA HERMOSILLO  [id number]  85yFemale    Patient is a 85y old  Female who presents with a chief complaint of 'urinary tract infection' (26 Apr 2017 21:29)      SUBJECTIVE:   Chart reviewed since last visit.  Patient seen and examined at bedside earlier today for jaundice.  Denies any nausea, vomiting or abdominal pain.  Denies any dysuria or diarrhea.  Denies any chest pain, dyspnea.  Waiting for ERCP      OBJECTIVE:  Vital Signs Last 24 Hrs  T(C): 36.8, Max: 36.8 (05-04 @ 11:24)  T(F): 98.2, Max: 98.2 (05-04 @ 11:24)  HR: 74 (56 - 79)  BP: 170/80 (127/68 - 170/80)  RR: 18 (16 - 18)  SpO2: 96% (95% - 98%)    PHYSICAL EXAMINATION  General: NAD[+]   nontoxic[+]     HEENT: AT/NC[+]    NECK: Supple[+]  Carotid Bruit[+]  CVS:   Irregular[+]  S1+S2[+]   Murmur[+]  RESP: Fair air entry bilaterally[+]   Clear sounds[+]    GI: Soft[+]  Nondistended[+]   Minimally Tenderness lower abdomen  - improved  Bowel Sounds[+]    : suprapubic tenderness[+/-]   CVA Tenderness[-]   Andrea[-]  MS: FROM[+]   Edema[-]  CNS: AAOx3[+]    No gross deficit appreciated  INTEG: Skin is Warm[+]  dry[+] Jaundiced  PSYCH: Fair Mood, Affect        I&O's Summary    I & Os for current day (as of 04 May 2017 13:14)  =============================================  IN: 0 ml / OUT: 0 ml / NET: 0 ml      PT/INR - ( 04 May 2017 07:14 )   PT: 15.7 sec;   INR: 1.42 ratio             MEDICATIONS  (STANDING):  levothyroxine 50MICROGram(s) Oral daily  pantoprazole    Tablet 40milliGRAM(s) Oral before breakfast  multivitamin 1Tablet(s) Oral daily  flecainide 50milliGRAM(s) Oral two times a day  lactobacillus acidophilus 1Tablet(s) Oral two times a day with meals  metoprolol succinate ER 50milliGRAM(s) Oral daily  amLODIPine   Tablet 10milliGRAM(s) Oral daily  levoFLOXacin IVPB 500milliGRAM(s) IV Intermittent every 24 hours      MEDICATIONS  (PRN):  acetaminophen   Tablet 650milliGRAM(s) Oral every 6 hours PRN For Temp greater than 38 C (100.4 F)  hydrALAZINE Injectable 10milliGRAM(s) IV Push every 6 hours PRN SBP>180, DBP>95

## 2017-05-04 NOTE — PROGRESS NOTE ADULT - ASSESSMENT
85 year old female with ESBL Klebsiella pneumonia UTI (Cystitis), without any evidence of sepsis or pyelonephritis. Given sensitivity patterns needs to be on IV antibiotics. Urine culture obtained- shows GNR. No bacteremia. Clinically improving. Completed 7 days IV Invanz. Asymptomatic.    CT abdomen (04/28/17) without any evidence of stone or pyelonephritis. Does show biliary ductal dilatation and mass in area of pancreatic head. Elevated transaminases as well as slightly elevated serum Lipase. Worsening LFT now with jaundice. Elevated CA 19.9 158 concerning for malignancy. Plan for ERCP today as INR<1.5    HTN  uncontrolled at admission - improved.    Chronic atrial fibrillation is rate controlled and INR downtrending. TTE shows moderate AS.    Hypothyroidism, clinically stable. TSH - 4.2, normal.

## 2017-05-05 DIAGNOSIS — K83.1 OBSTRUCTION OF BILE DUCT: ICD-10-CM

## 2017-05-05 LAB
ALBUMIN SERPL ELPH-MCNC: 3.2 G/DL — LOW (ref 3.3–5.2)
ALP SERPL-CCNC: 768 U/L — HIGH (ref 40–120)
ALT FLD-CCNC: 379 U/L — HIGH
ANION GAP SERPL CALC-SCNC: 12 MMOL/L — SIGNIFICANT CHANGE UP (ref 5–17)
AST SERPL-CCNC: 366 U/L — HIGH
BILIRUB SERPL-MCNC: 7.5 MG/DL — HIGH (ref 0.4–2)
BUN SERPL-MCNC: 22 MG/DL — HIGH (ref 8–20)
CALCIUM SERPL-MCNC: 9 MG/DL — SIGNIFICANT CHANGE UP (ref 8.6–10.2)
CHLORIDE SERPL-SCNC: 98 MMOL/L — SIGNIFICANT CHANGE UP (ref 98–107)
CO2 SERPL-SCNC: 25 MMOL/L — SIGNIFICANT CHANGE UP (ref 22–29)
CREAT SERPL-MCNC: 0.51 MG/DL — SIGNIFICANT CHANGE UP (ref 0.5–1.3)
GLUCOSE SERPL-MCNC: 136 MG/DL — HIGH (ref 70–115)
GRAM STN FLD: SIGNIFICANT CHANGE UP
HCT VFR BLD CALC: 36 % — LOW (ref 37–47)
HGB BLD-MCNC: 12.5 G/DL — SIGNIFICANT CHANGE UP (ref 12–16)
MCHC RBC-ENTMCNC: 30.6 PG — SIGNIFICANT CHANGE UP (ref 27–31)
MCHC RBC-ENTMCNC: 34.7 G/DL — SIGNIFICANT CHANGE UP (ref 32–36)
MCV RBC AUTO: 88.2 FL — SIGNIFICANT CHANGE UP (ref 81–99)
PLATELET # BLD AUTO: 205 K/UL — SIGNIFICANT CHANGE UP (ref 150–400)
POTASSIUM SERPL-MCNC: 4.1 MMOL/L — SIGNIFICANT CHANGE UP (ref 3.5–5.3)
POTASSIUM SERPL-SCNC: 4.1 MMOL/L — SIGNIFICANT CHANGE UP (ref 3.5–5.3)
PROT SERPL-MCNC: 6.8 G/DL — SIGNIFICANT CHANGE UP (ref 6.6–8.7)
RBC # BLD: 4.08 M/UL — LOW (ref 4.4–5.2)
RBC # FLD: 13.9 % — SIGNIFICANT CHANGE UP (ref 11–15.6)
SODIUM SERPL-SCNC: 135 MMOL/L — SIGNIFICANT CHANGE UP (ref 135–145)
SPECIMEN SOURCE: SIGNIFICANT CHANGE UP
WBC # BLD: 7 K/UL — SIGNIFICANT CHANGE UP (ref 4.8–10.8)
WBC # FLD AUTO: 7 K/UL — SIGNIFICANT CHANGE UP (ref 4.8–10.8)

## 2017-05-05 PROCEDURE — 47533 PLMT BILIARY DRAINAGE CATH: CPT

## 2017-05-05 PROCEDURE — 99233 SBSQ HOSP IP/OBS HIGH 50: CPT

## 2017-05-05 RX ORDER — SODIUM CHLORIDE 9 MG/ML
1000 INJECTION INTRAMUSCULAR; INTRAVENOUS; SUBCUTANEOUS
Qty: 0 | Refills: 0 | Status: DISCONTINUED | OUTPATIENT
Start: 2017-05-05 | End: 2017-05-05

## 2017-05-05 RX ORDER — FENTANYL CITRATE 50 UG/ML
50 INJECTION INTRAVENOUS
Qty: 0 | Refills: 0 | Status: DISCONTINUED | OUTPATIENT
Start: 2017-05-05 | End: 2017-05-05

## 2017-05-05 RX ORDER — ONDANSETRON 8 MG/1
4 TABLET, FILM COATED ORAL ONCE
Qty: 0 | Refills: 0 | Status: DISCONTINUED | OUTPATIENT
Start: 2017-05-05 | End: 2017-05-05

## 2017-05-05 RX ORDER — HYDROMORPHONE HYDROCHLORIDE 2 MG/ML
0.5 INJECTION INTRAMUSCULAR; INTRAVENOUS; SUBCUTANEOUS
Qty: 0 | Refills: 0 | Status: DISCONTINUED | OUTPATIENT
Start: 2017-05-05 | End: 2017-05-05

## 2017-05-05 RX ADMIN — Medication 400 MILLIGRAM(S): at 20:50

## 2017-05-05 RX ADMIN — Medication 400 MILLIGRAM(S): at 19:59

## 2017-05-05 RX ADMIN — Medication 50 MILLIGRAM(S): at 17:36

## 2017-05-05 RX ADMIN — Medication 1 TABLET(S): at 15:15

## 2017-05-05 RX ADMIN — FENTANYL CITRATE 50 MICROGRAM(S): 50 INJECTION INTRAVENOUS at 12:48

## 2017-05-05 RX ADMIN — Medication 50 MILLIGRAM(S): at 06:49

## 2017-05-05 RX ADMIN — FENTANYL CITRATE 50 MICROGRAM(S): 50 INJECTION INTRAVENOUS at 12:55

## 2017-05-05 RX ADMIN — Medication 1 TABLET(S): at 17:36

## 2017-05-05 RX ADMIN — Medication 50 MICROGRAM(S): at 06:50

## 2017-05-05 NOTE — PROGRESS NOTE ADULT - ATTENDING COMMENTS
CT will be reviewed  Awaiting EUS +/- biopsy  Will follow
Discussed with patient daughter over telephone, ID
Discussed with GI.  Continue to hold Coumadin, check INR in am. If still elevated will transfuse FFP with procedure.  Cardiology consultation with [facility] called for risk evaluation, prior records.
Discussed with daughter Jazmin
as above.   GI consult for EUS
Patient comfortable and states no complaints.  Denies abdominal pain.  EUS done yesterday by Dr. Qiu and shows a 25 mm uncinate mass, distal CBD obstruction and periampullary diverticulum (mucosal biopsy taken).  Abdomen: soft/NT/ND.  Given the finding of a pancreatic uncinate process mass, agree with plan to consult surgical oncology.  ACS will sign-off.  Please re-consult prn.

## 2017-05-05 NOTE — BRIEF OPERATIVE NOTE - OPERATION/FINDINGS
eus: 25 mm uncinate mass with distal CBD obstruction near ampulla- s/p FNAx5. Non-dilated PD. periampullary diverticulum  ercp: periampullary diverticulum with distorted anatomy (congested, friable periampullary mucosa). multiple attempts at cannulation at the possible  ampullary site failed. ampullary biopsy taken
8f drain external as requested by dr farrar

## 2017-05-05 NOTE — BRIEF OPERATIVE NOTE - PROCEDURE
ERCP  05/04/2017    Active  CHARLIEQBAL  Endoscopic ultrasound examination  05/04/2017    Active  CHARLIEQBAL
Drainage, biliary tract, external, percutaneous  05/05/2017    Active  HALPER

## 2017-05-05 NOTE — PROGRESS NOTE ADULT - ASSESSMENT
85 F with h/o A-fib (was on coumadin) who was admitted with weight loss and painless jaundice. CT abdomen showed 3 cms pancreatic uncinate mass with upstream CBD dilation and involvement of SV/PVC.  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 Non-dilated PD. periampullary diverticulum. ERCP the same day: periampullary diverticulum with distorted anatomy (congested, friable periampullary mucosa). multiple attempts at cannulation at the possible  ampullary site failed. ampullary biopsy taken.      Plan:  monitor LFTs and INR  f/u cytology and path results  IV/oral antibiotics for 7 days  case d/w IR team- patient to undergo PTC biliary drainage today  surgery oncology evaluation (Dr. Holder)

## 2017-05-05 NOTE — PROGRESS NOTE ADULT - SUBJECTIVE AND OBJECTIVE BOX
INTERVAL HPI/OVERNIGHT EVENTS:  Patient seen and examined    MEDICATIONS  (STANDING):  levothyroxine 50MICROGram(s) Oral daily  pantoprazole    Tablet 40milliGRAM(s) Oral before breakfast  multivitamin 1Tablet(s) Oral daily  flecainide 50milliGRAM(s) Oral two times a day  lactobacillus acidophilus 1Tablet(s) Oral two times a day with meals  metoprolol succinate ER 50milliGRAM(s) Oral daily  amLODIPine   Tablet 10milliGRAM(s) Oral daily  levoFLOXacin IVPB 500milliGRAM(s) IV Intermittent every 24 hours    MEDICATIONS  (PRN):  acetaminophen   Tablet 650milliGRAM(s) Oral every 6 hours PRN For Temp greater than 38 C (100.4 F)  hydrALAZINE Injectable 10milliGRAM(s) IV Push every 6 hours PRN SBP>180, DBP>95  ibuprofen  Tablet 400milliGRAM(s) Oral every 6 hours PRN pain      Allergies    No Known Allergies    Intolerances        Vital Signs Last 24 Hrs  T(C): 36.7, Max: 36.8 (05-04 @ 11:24)  T(F): 98.1, Max: 98.2 (05-04 @ 11:24)  HR: 67 (64 - 75)  BP: 157/78 (110/60 - 171/93)  BP(mean): --  RR: 18 (18 - 18)  SpO2: 98% (96% - 99%)    PHYSICAL EXAM:  General: NAD.  CVS: S1, S2  Chest: air entry bilaterally present  Abd: BS present, soft, non-tender      LABS:                        12.5   7.0   )-----------( 205      ( 05 May 2017 08:13 )             36.0     05-05    135  |  98  |  22.0<H>  ----------------------------<  136<H>  4.1   |  25.0  |  0.51    Ca    9.0      05 May 2017 08:13    TPro  6.8  /  Alb  3.2<L>  /  TBili  7.5<H>  /  DBili  x   /  AST  366<H>  /  ALT  379<H>  /  AlkPhos  768<H>  05-05    PT/INR - ( 04 May 2017 07:14 )   PT: 15.7 sec;   INR: 1.42 ratio             RADIOLOGY & ADDITIONAL TESTS:

## 2017-05-05 NOTE — PROGRESS NOTE ADULT - ASSESSMENT
85 year old female with ESBL Klebsiella pneumonia UTI (Cystitis), without any evidence of sepsis or pyelonephritis. Given sensitivity patterns needs to be on IV antibiotics. Urine culture obtained- shows GNR. No bacteremia. Clinically improving. Completed 7 days IV Invanz. Asymptomatic.    CT abdomen (04/28/17) without any evidence of stone or pyelonephritis. Does show biliary ductal dilatation and mass in area of pancreatic head. Elevated transaminases as well as slightly elevated serum Lipase. Worsening LFT now with jaundice. Elevated CA 19.9 158 concerning for malignancy. ERCP attempted on 05/04/17 - unsuccessful, however FNA mass taken.  Underwent Percutaneous biliary drainage by IR 05/05/17.    HTN  uncontrolled at admission - improved.    Chronic atrial fibrillation is rate controlled and INR downtrending. TTE shows moderate AS.    Hypothyroidism, clinically stable. TSH - 4.2, normal.

## 2017-05-05 NOTE — PROGRESS NOTE ADULT - PROBLEM SELECTOR PLAN 5
Continue Flecainide, Metoprolol  Will keep Coumadin on hold for now given may proceed with surgery  Resume after Procedures/interventions complete

## 2017-05-05 NOTE — PROGRESS NOTE ADULT - SUBJECTIVE AND OBJECTIVE BOX
HOSPITALIST PROGRESS NOTE    TABITHA HERMOSILLO  [id number]  85yFemale    Patient is a 85y old  Female who presents with a chief complaint of 'urinary tract infection' (26 Apr 2017 21:29)      SUBJECTIVE:   Chart reviewed since last visit.  Patient seen and examined at bedside earlier today in PACU after percutaneous biliary drain.  Somewhat sedated.  Denies any nausea, vomiting, dyspnea or chest pain.      OBJECTIVE:  Vital Signs Last 24 Hrs  T(C): 37.1, Max: 37.1 (05-05 @ 14:26)  T(F): 98.7, Max: 98.7 (05-05 @ 14:26)  HR: 63 (62 - 75)  BP: 137/70 (122/57 - 171/93)  RR: 18 (11 - 18)  SpO2: 96% (94% - 100%)    PHYSICAL EXAMINATION  General: NAD[+]   nontoxic[+]     HEENT: AT/NC[+]    NECK: Supple[+]  Carotid Bruit[+]  CVS:   Irregular[+]  S1+S2[+]   Murmur[+]  RESP: Fair air entry bilaterally[+]   Clear sounds[+]    GI: Soft[+]  Nondistended[+]   Minimally Tenderness lower abdomen  - improved  Bowel Sounds[+]  Biliary drain in epigastric region{[+]  : suprapubic tenderness[+/-]   CVA Tenderness[-]   Andrea[-]  MS: FROM[+]   Edema[-]  CNS: AAOx3[+]    No gross deficit appreciated  INTEG: Skin is Warm[+]  dry[+] Jaundiced  PSYCH: Fair Mood, Affect  MONITOR:  CAPILLARY BLOOD GLUCOSE        I&O's Summary    I & Os for current day (as of 05 May 2017 16:30)  =============================================  IN: 0 ml / OUT: 0 ml / NET: 0 ml                          12.5   7.0   )-----------( 205      ( 05 May 2017 08:13 )             36.0     PT/INR - ( 04 May 2017 07:14 )   PT: 15.7 sec;   INR: 1.42 ratio           05-05    135  |  98  |  22.0<H>  ----------------------------<  136<H>  4.1   |  25.0  |  0.51    Ca    9.0      05 May 2017 08:13    TPro  6.8  /  Alb  3.2<L>  /  TBili  7.5<H>  /  DBili  x   /  AST  366<H>  /  ALT  379<H>  /  AlkPhos  768<H>  05-05            MEDICATIONS  (STANDING):  levothyroxine 50MICROGram(s) Oral daily  pantoprazole    Tablet 40milliGRAM(s) Oral before breakfast  multivitamin 1Tablet(s) Oral daily  flecainide 50milliGRAM(s) Oral two times a day  lactobacillus acidophilus 1Tablet(s) Oral two times a day with meals  metoprolol succinate ER 50milliGRAM(s) Oral daily  amLODIPine   Tablet 10milliGRAM(s) Oral daily  levoFLOXacin IVPB 500milliGRAM(s) IV Intermittent every 24 hours      MEDICATIONS  (PRN):  acetaminophen   Tablet 650milliGRAM(s) Oral every 6 hours PRN For Temp greater than 38 C (100.4 F)  hydrALAZINE Injectable 10milliGRAM(s) IV Push every 6 hours PRN SBP>180, DBP>95  ibuprofen  Tablet 400milliGRAM(s) Oral every 6 hours PRN pain

## 2017-05-05 NOTE — PROGRESS NOTE ADULT - SUBJECTIVE AND OBJECTIVE BOX
SUBJECTIVE: Patient seen and examined at bedside. No acute events overnight. Pt for EUS today. Pt NPO for procedure. Pt denies abdominal pain at this time.      MEDICATIONS  (STANDING):  levothyroxine 50MICROGram(s) Oral daily  pantoprazole    Tablet 40milliGRAM(s) Oral before breakfast  multivitamin 1Tablet(s) Oral daily  flecainide 50milliGRAM(s) Oral two times a day  lactobacillus acidophilus 1Tablet(s) Oral two times a day with meals  metoprolol succinate ER 50milliGRAM(s) Oral daily  amLODIPine   Tablet 10milliGRAM(s) Oral daily  levoFLOXacin IVPB 500milliGRAM(s) IV Intermittent every 24 hours  sodium chloride 0.9%. 1000milliLiter(s) IV Continuous <Continuous>    MEDICATIONS  (PRN):  acetaminophen   Tablet 650milliGRAM(s) Oral every 6 hours PRN For Temp greater than 38 C (100.4 F)  hydrALAZINE Injectable 10milliGRAM(s) IV Push every 6 hours PRN SBP>180, DBP>95  ibuprofen  Tablet 400milliGRAM(s) Oral every 6 hours PRN pain  fentaNYL    Injectable 50MICROGram(s) IV Push every 5 minutes PRN Severe Pain  HYDROmorphone  Injectable 0.5milliGRAM(s) IV Push every 10 minutes PRN Moderate Pain  ondansetron Injectable 4milliGRAM(s) IV Push once PRN Nausea and/or Vomiting      Vital Signs Last 24 Hrs  T(C): 36.5, Max: 36.8 (05-04 @ 15:55)  T(F): 97.7, Max: 98.2 (05-04 @ 15:55)  HR: 65 (64 - 75)  BP: 151/64 (110/60 - 171/93)  BP(mean): --  RR: 12 (12 - 18)  SpO2: 100% (98% - 100%)    PE  Gen: NAD, AAOx3  Pulm: CTAB  CV: RRR, normal intensity s1/s2  Abd: soft, nontender, nondistended  Ext: moving all extremities  Neuro: GCS 15      I&O's Detail    I & Os for current day (as of 05 May 2017 13:04)  =============================================  IN:    Total IN: 0 ml  ---------------------------------------------  OUT:    Total OUT: 0 ml  ---------------------------------------------  Total NET: 0 ml      LABS:                        12.5   7.0   )-----------( 205      ( 05 May 2017 08:13 )             36.0     05-05    135  |  98  |  22.0<H>  ----------------------------<  136<H>  4.1   |  25.0  |  0.51    Ca    9.0      05 May 2017 08:13    TPro  6.8  /  Alb  3.2<L>  /  TBili  7.5<H>  /  DBili  x   /  AST  366<H>  /  ALT  379<H>  /  AlkPhos  768<H>  05-05    PT/INR - ( 04 May 2017 07:14 )   PT: 15.7 sec;   INR: 1.42 ratio SUBJECTIVE: Patient seen and examined at bedside. No acute events overnight. Pt had EUS yesterday.  Pt denies abdominal pain at this time.      MEDICATIONS  (STANDING):  levothyroxine 50MICROGram(s) Oral daily  pantoprazole    Tablet 40milliGRAM(s) Oral before breakfast  multivitamin 1Tablet(s) Oral daily  flecainide 50milliGRAM(s) Oral two times a day  lactobacillus acidophilus 1Tablet(s) Oral two times a day with meals  metoprolol succinate ER 50milliGRAM(s) Oral daily  amLODIPine   Tablet 10milliGRAM(s) Oral daily  levoFLOXacin IVPB 500milliGRAM(s) IV Intermittent every 24 hours  sodium chloride 0.9%. 1000milliLiter(s) IV Continuous <Continuous>    MEDICATIONS  (PRN):  acetaminophen   Tablet 650milliGRAM(s) Oral every 6 hours PRN For Temp greater than 38 C (100.4 F)  hydrALAZINE Injectable 10milliGRAM(s) IV Push every 6 hours PRN SBP>180, DBP>95  ibuprofen  Tablet 400milliGRAM(s) Oral every 6 hours PRN pain  fentaNYL    Injectable 50MICROGram(s) IV Push every 5 minutes PRN Severe Pain  HYDROmorphone  Injectable 0.5milliGRAM(s) IV Push every 10 minutes PRN Moderate Pain  ondansetron Injectable 4milliGRAM(s) IV Push once PRN Nausea and/or Vomiting      Vital Signs Last 24 Hrs  T(C): 36.5, Max: 36.8 (05-04 @ 15:55)  T(F): 97.7, Max: 98.2 (05-04 @ 15:55)  HR: 65 (64 - 75)  BP: 151/64 (110/60 - 171/93)  BP(mean): --  RR: 12 (12 - 18)  SpO2: 100% (98% - 100%)    PE  Gen: NAD, AAOx3  Pulm: CTAB  CV: RRR, normal intensity s1/s2  Abd: soft, nontender, nondistended  Ext: moving all extremities  Neuro: GCS 15      I&O's Detail    I & Os for current day (as of 05 May 2017 13:04)  =============================================  IN:    Total IN: 0 ml  ---------------------------------------------  OUT:    Total OUT: 0 ml  ---------------------------------------------  Total NET: 0 ml      LABS:                        12.5   7.0   )-----------( 205      ( 05 May 2017 08:13 )             36.0     05-05    135  |  98  |  22.0<H>  ----------------------------<  136<H>  4.1   |  25.0  |  0.51    Ca    9.0      05 May 2017 08:13    TPro  6.8  /  Alb  3.2<L>  /  TBili  7.5<H>  /  DBili  x   /  AST  366<H>  /  ALT  379<H>  /  AlkPhos  768<H>  05-05    PT/INR - ( 04 May 2017 07:14 )   PT: 15.7 sec;   INR: 1.42 ratio

## 2017-05-05 NOTE — PROGRESS NOTE ADULT - PROBLEM SELECTOR PLAN 1
-no further surgical intervention require from our service  -please call [facility] with questions or concerns  -please reconsult as needed -no further surgical intervention require from our service as surgical oncology (Dr. Holder) has been consulted  -please call [facility] with questions or concerns  -please reconsult as needed

## 2017-05-06 LAB
ALBUMIN SERPL ELPH-MCNC: 2.6 G/DL — LOW (ref 3.3–5.2)
ALP SERPL-CCNC: 532 U/L — HIGH (ref 40–120)
ALT FLD-CCNC: 252 U/L — HIGH
ANION GAP SERPL CALC-SCNC: 11 MMOL/L — SIGNIFICANT CHANGE UP (ref 5–17)
AST SERPL-CCNC: 140 U/L — HIGH
BILIRUB SERPL-MCNC: 3.2 MG/DL — HIGH (ref 0.4–2)
BUN SERPL-MCNC: 22 MG/DL — HIGH (ref 8–20)
CALCIUM SERPL-MCNC: 8.7 MG/DL — SIGNIFICANT CHANGE UP (ref 8.6–10.2)
CHLORIDE SERPL-SCNC: 97 MMOL/L — LOW (ref 98–107)
CO2 SERPL-SCNC: 29 MMOL/L — SIGNIFICANT CHANGE UP (ref 22–29)
CREAT SERPL-MCNC: 0.7 MG/DL — SIGNIFICANT CHANGE UP (ref 0.5–1.3)
GLUCOSE SERPL-MCNC: 95 MG/DL — SIGNIFICANT CHANGE UP (ref 70–115)
HCT VFR BLD CALC: 32.3 % — LOW (ref 37–47)
HGB BLD-MCNC: 10.9 G/DL — LOW (ref 12–16)
MCHC RBC-ENTMCNC: 30.7 PG — SIGNIFICANT CHANGE UP (ref 27–31)
MCHC RBC-ENTMCNC: 33.7 G/DL — SIGNIFICANT CHANGE UP (ref 32–36)
MCV RBC AUTO: 91 FL — SIGNIFICANT CHANGE UP (ref 81–99)
PLATELET # BLD AUTO: 168 K/UL — SIGNIFICANT CHANGE UP (ref 150–400)
POTASSIUM SERPL-MCNC: 3.8 MMOL/L — SIGNIFICANT CHANGE UP (ref 3.5–5.3)
POTASSIUM SERPL-SCNC: 3.8 MMOL/L — SIGNIFICANT CHANGE UP (ref 3.5–5.3)
PROT SERPL-MCNC: 5.7 G/DL — LOW (ref 6.6–8.7)
RBC # BLD: 3.55 M/UL — LOW (ref 4.4–5.2)
RBC # FLD: 14.6 % — SIGNIFICANT CHANGE UP (ref 11–15.6)
SODIUM SERPL-SCNC: 137 MMOL/L — SIGNIFICANT CHANGE UP (ref 135–145)
WBC # BLD: 10.2 K/UL — SIGNIFICANT CHANGE UP (ref 4.8–10.8)
WBC # FLD AUTO: 10.2 K/UL — SIGNIFICANT CHANGE UP (ref 4.8–10.8)

## 2017-05-06 PROCEDURE — 99233 SBSQ HOSP IP/OBS HIGH 50: CPT

## 2017-05-06 PROCEDURE — 71260 CT THORAX DX C+: CPT | Mod: 26

## 2017-05-06 RX ORDER — ENOXAPARIN SODIUM 100 MG/ML
40 INJECTION SUBCUTANEOUS DAILY
Qty: 0 | Refills: 0 | Status: DISCONTINUED | OUTPATIENT
Start: 2017-05-06 | End: 2017-05-09

## 2017-05-06 RX ADMIN — Medication 1 TABLET(S): at 11:51

## 2017-05-06 RX ADMIN — Medication 50 MICROGRAM(S): at 05:49

## 2017-05-06 RX ADMIN — Medication 1 TABLET(S): at 18:38

## 2017-05-06 RX ADMIN — ENOXAPARIN SODIUM 40 MILLIGRAM(S): 100 INJECTION SUBCUTANEOUS at 14:17

## 2017-05-06 RX ADMIN — Medication 50 MILLIGRAM(S): at 05:48

## 2017-05-06 RX ADMIN — PANTOPRAZOLE SODIUM 40 MILLIGRAM(S): 20 TABLET, DELAYED RELEASE ORAL at 05:49

## 2017-05-06 RX ADMIN — Medication 50 MILLIGRAM(S): at 18:38

## 2017-05-06 RX ADMIN — Medication 1 TABLET(S): at 08:23

## 2017-05-06 RX ADMIN — Medication 50 MILLIGRAM(S): at 05:49

## 2017-05-06 NOTE — PROGRESS NOTE ADULT - ASSESSMENT
85 year old female with ESBL Klebsiella pneumonia UTI (Cystitis), without any evidence of sepsis or pyelonephritis. Given sensitivity patterns needs to be on IV antibiotics. Urine culture obtained- shows GNR. No bacteremia. Clinically improving. Completed 7 days IV Invanz. Asymptomatic.    CT abdomen (04/28/17) without any evidence of stone or pyelonephritis. Does show biliary ductal dilatation and mass in area of pancreatic head. Elevated transaminases as well as slightly elevated serum Lipase. Worsening LFT now with jaundice. Elevated CA 19.9 158 concerning for malignancy. ERCP attempted on 05/04/17 - unsuccessful, however FNA mass taken.  Underwent Percutaneous biliary drainage by IR 05/05/17.    HTN  uncontrolled at admission - improved.    Chronic atrial fibrillation is rate controlled and INR downtrending. TTE shows moderate AS.    Hypothyroidism, clinically stable. TSH - 4.2, normal. 85 year old female with ESBL Klebsiella pneumonia UTI (Cystitis), without any evidence of sepsis or pyelonephritis. Given sensitivity patterns needs to be on IV antibiotics. Urine culture obtained- shows GNR. No bacteremia. Clinically improving. Completed 7 days IV Invanz. Asymptomatic.    CT abdomen (04/28/17) without any evidence of stone or pyelonephritis. Does show biliary ductal dilatation and mass in area of pancreatic head. Elevated transaminases as well as slightly elevated serum Lipase. Worsening LFT now with jaundice. Elevated CA 19.9 158 concerning for malignancy. ERCP attempted on 05/04/17 - unsuccessful, however FNA mass taken.  Underwent Percutaneous biliary drainage by IR 05/05/17. LFT downtrending    HTN  uncontrolled at admission - improved.    Chronic atrial fibrillation is rate controlled and INR downtrending. TTE shows moderate AS.    Hypothyroidism, clinically stable. TSH - 4.2, normal.

## 2017-05-06 NOTE — PROGRESS NOTE ADULT - SUBJECTIVE AND OBJECTIVE BOX
HOSPITALIST PROGRESS NOTE    TABITHA HERMOSILLO  [id number]  85yFemale    Patient is a 85y old  Female who presents with a chief complaint of 'urinary tract infection' (26 Apr 2017 21:29)      SUBJECTIVE:   Chart reviewed since last visit.  Patient seen and examined at bedside for pancreatic mass, obstructive jaundice.  Occasional nausea, no vomiting, epigastric pain at drain site.  Denies fever or chills  Denies dyspnea, palpitations, chest pain.      OBJECTIVE:  Vital Signs Last 24 Hrs  T(C): 36.6, Max: 37.1 (05-05 @ 14:26)  T(F): 97.9, Max: 98.7 (05-05 @ 14:26)  HR: 60 (60 - 67)  BP: 130/66 (92/50 - 171/84)  BP(mean): --  RR: 18 (18 - 18)  SpO2: 96% (92% - 96%)    PHYSICAL EXAMINATION  General: NAD[+]   nontoxic[+]   Sitting in chair  HEENT: AT/NC[+]    NECK: Supple[+]  Carotid Bruit[+]  CVS:   Irregular[+]  S1+S2[+]   Murmur[+]  RESP: Fair air entry bilaterally[+]   Clear sounds[+]    GI: Soft[+]  Nondistended[+]   Minimally Tenderness lower abdomen  - improved  Bowel Sounds[+]  Biliary drain in epigastric region[+]  : suprapubic tenderness[+/-]   CVA Tenderness[-]   Andrea[-]  MS: FROM[+]   Edema[-]  CNS: AAOx3[+]    No gross deficit appreciated  INTEG: Skin is Warm[+]  dry[+] Jaundiced  PSYCH: Fair Mood, Affect    MONITOR:  CAPILLARY BLOOD GLUCOSE        I&O's Summary  I & Os for 24h ending 06 May 2017 07:00  =============================================  IN: 0 ml / OUT: 225 ml / NET: -225 ml    I & Os for current day (as of 06 May 2017 13:46)  =============================================  IN: 100 ml / OUT: 125 ml / NET: -25 ml                          10.9   10.2  )-----------( 168      ( 06 May 2017 09:35 )             32.3       05-06    137  |  97<L>  |  22.0<H>  ----------------------------<  95  3.8   |  29.0  |  0.70    Ca    8.7      06 May 2017 09:34    TPro  5.7<L>  /  Alb  2.6<L>  /  TBili  3.2<H>  /  DBili  x   /  AST  140<H>  /  ALT  252<H>  /  AlkPhos  532<H>  05-06              MEDICATIONS  (STANDING):  levothyroxine 50MICROGram(s) Oral daily  pantoprazole    Tablet 40milliGRAM(s) Oral before breakfast  multivitamin 1Tablet(s) Oral daily  flecainide 50milliGRAM(s) Oral two times a day  lactobacillus acidophilus 1Tablet(s) Oral two times a day with meals  metoprolol succinate ER 50milliGRAM(s) Oral daily  amLODIPine   Tablet 10milliGRAM(s) Oral daily  levoFLOXacin IVPB 500milliGRAM(s) IV Intermittent every 24 hours  enoxaparin Injectable 40milliGRAM(s) SubCutaneous daily      MEDICATIONS  (PRN):  acetaminophen   Tablet 650milliGRAM(s) Oral every 6 hours PRN For Temp greater than 38 C (100.4 F)  hydrALAZINE Injectable 10milliGRAM(s) IV Push every 6 hours PRN SBP>180, DBP>95  ibuprofen  Tablet 400milliGRAM(s) Oral every 6 hours PRN pain

## 2017-05-07 DIAGNOSIS — K66.8 OTHER SPECIFIED DISORDERS OF PERITONEUM: ICD-10-CM

## 2017-05-07 LAB
-  AMPICILLIN: SIGNIFICANT CHANGE UP
-  ERYTHROMYCIN: SIGNIFICANT CHANGE UP
-  TETRACYCLINE: SIGNIFICANT CHANGE UP
-  VANCOMYCIN: SIGNIFICANT CHANGE UP
ALBUMIN SERPL ELPH-MCNC: 3.2 G/DL — LOW (ref 3.3–5.2)
ALP SERPL-CCNC: 525 U/L — HIGH (ref 40–120)
ALT FLD-CCNC: 199 U/L — HIGH
ANION GAP SERPL CALC-SCNC: 13 MMOL/L — SIGNIFICANT CHANGE UP (ref 5–17)
APTT BLD: 33.4 SEC — SIGNIFICANT CHANGE UP (ref 27.5–37.4)
AST SERPL-CCNC: 74 U/L — HIGH
BILIRUB SERPL-MCNC: 3.1 MG/DL — HIGH (ref 0.4–2)
BUN SERPL-MCNC: 17 MG/DL — SIGNIFICANT CHANGE UP (ref 8–20)
CALCIUM SERPL-MCNC: 9.1 MG/DL — SIGNIFICANT CHANGE UP (ref 8.6–10.2)
CHLORIDE SERPL-SCNC: 100 MMOL/L — SIGNIFICANT CHANGE UP (ref 98–107)
CO2 SERPL-SCNC: 29 MMOL/L — SIGNIFICANT CHANGE UP (ref 22–29)
CREAT SERPL-MCNC: 0.59 MG/DL — SIGNIFICANT CHANGE UP (ref 0.5–1.3)
GLUCOSE SERPL-MCNC: 96 MG/DL — SIGNIFICANT CHANGE UP (ref 70–115)
HCT VFR BLD CALC: 35.5 % — LOW (ref 37–47)
HGB BLD-MCNC: 12 G/DL — SIGNIFICANT CHANGE UP (ref 12–16)
INR BLD: 1.38 RATIO — HIGH (ref 0.88–1.16)
MCHC RBC-ENTMCNC: 30.7 PG — SIGNIFICANT CHANGE UP (ref 27–31)
MCHC RBC-ENTMCNC: 33.8 G/DL — SIGNIFICANT CHANGE UP (ref 32–36)
MCV RBC AUTO: 90.8 FL — SIGNIFICANT CHANGE UP (ref 81–99)
METHOD TYPE: SIGNIFICANT CHANGE UP
PLATELET # BLD AUTO: 167 K/UL — SIGNIFICANT CHANGE UP (ref 150–400)
POTASSIUM SERPL-MCNC: 3.8 MMOL/L — SIGNIFICANT CHANGE UP (ref 3.5–5.3)
POTASSIUM SERPL-SCNC: 3.8 MMOL/L — SIGNIFICANT CHANGE UP (ref 3.5–5.3)
PROT SERPL-MCNC: 6.6 G/DL — SIGNIFICANT CHANGE UP (ref 6.6–8.7)
PROTHROM AB SERPL-ACNC: 15.3 SEC — HIGH (ref 9.8–12.7)
RBC # BLD: 3.91 M/UL — LOW (ref 4.4–5.2)
RBC # FLD: 14.5 % — SIGNIFICANT CHANGE UP (ref 11–15.6)
SODIUM SERPL-SCNC: 142 MMOL/L — SIGNIFICANT CHANGE UP (ref 135–145)
WBC # BLD: 7.4 K/UL — SIGNIFICANT CHANGE UP (ref 4.8–10.8)
WBC # FLD AUTO: 7.4 K/UL — SIGNIFICANT CHANGE UP (ref 4.8–10.8)

## 2017-05-07 PROCEDURE — 99233 SBSQ HOSP IP/OBS HIGH 50: CPT

## 2017-05-07 PROCEDURE — 74176 CT ABD & PELVIS W/O CONTRAST: CPT | Mod: 26

## 2017-05-07 PROCEDURE — 99232 SBSQ HOSP IP/OBS MODERATE 35: CPT

## 2017-05-07 RX ADMIN — Medication 50 MILLIGRAM(S): at 17:25

## 2017-05-07 RX ADMIN — Medication 650 MILLIGRAM(S): at 21:18

## 2017-05-07 RX ADMIN — Medication 1 TABLET(S): at 16:35

## 2017-05-07 RX ADMIN — ENOXAPARIN SODIUM 40 MILLIGRAM(S): 100 INJECTION SUBCUTANEOUS at 21:19

## 2017-05-07 NOTE — PROGRESS NOTE ADULT - ASSESSMENT
85 F with h/o A-fib (was on coumadin) who was admitted with weight loss and painless jaundice. CT abdomen showed 3 cms pancreatic uncinate mass with upstream CBD dilation and involvement of SV/PVC.  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 Non-dilated PD. periampullary diverticulum. ERCP the same day: periampullary diverticulum with distorted anatomy (congested, friable periampullary mucosa). multiple attempts at cannulation at the possible  ampullary site failed. ampullary biopsy taken. she is s/p PTC biliary drainge 5/5/17.  - small amount of retroperitoneal air noted on CT abdomen is related to attempts at biliary cannulation during ercp. there is NO duodenal perforation  - no abd pain, no fever, patient is hungry      Plan:  monitor LFTs and INR  f/u cytology and path results  IV/oral antibiotics for 7 days  start clears today  surgery oncology f/u

## 2017-05-07 NOTE — CHART NOTE - NSCHARTNOTEFT_GEN_A_CORE
Got a call from Radiology that patient has multifocal pneumoperitoneum and retroperitoneal gas. Patient looks comfortable. Abdominal exam is benign at this time.  Plan:  - NPO for now.  - CT Abd/Pelvis to rule out perforation.  - Serial Abdominal Exams.  - Discussed with Surgical Team - will follow.

## 2017-05-07 NOTE — PROGRESS NOTE ADULT - SUBJECTIVE AND OBJECTIVE BOX
SUBJECTIVE:   84 yo f w/ 25 mm uncinate mass w distal CBD obstruction and periampullary diverticulum (mucosal biopsy taken) s/p EUS, now s/p percutaneous biliary drain placement on 5/5 by IR; surgery was called for incidental findings of small retroperitoneal air seen on CT. Pt otherwise with no complaints of abdominal pain and tolerating diet with no nausea/ no vomiting at this time. Has been afebrile HD normal. on labs no leukocytosis; LFT's/Tbili trending down.      MEDICATIONS  (STANDING):  levothyroxine 50MICROGram(s) Oral daily  pantoprazole    Tablet 40milliGRAM(s) Oral before breakfast  multivitamin 1Tablet(s) Oral daily  flecainide 50milliGRAM(s) Oral two times a day  lactobacillus acidophilus 1Tablet(s) Oral two times a day with meals  metoprolol succinate ER 50milliGRAM(s) Oral daily  amLODIPine   Tablet 10milliGRAM(s) Oral daily  levoFLOXacin IVPB 500milliGRAM(s) IV Intermittent every 24 hours  sodium chloride 0.9%. 1000milliLiter(s) IV Continuous <Continuous>    MEDICATIONS  (PRN):  acetaminophen   Tablet 650milliGRAM(s) Oral every 6 hours PRN For Temp greater than 38 C (100.4 F)  hydrALAZINE Injectable 10milliGRAM(s) IV Push every 6 hours PRN SBP>180, DBP>95  ibuprofen  Tablet 400milliGRAM(s) Oral every 6 hours PRN pain  fentaNYL    Injectable 50MICROGram(s) IV Push every 5 minutes PRN Severe Pain  HYDROmorphone  Injectable 0.5milliGRAM(s) IV Push every 10 minutes PRN Moderate Pain  ondansetron Injectable 4milliGRAM(s) IV Push once PRN Nausea and/or Vomiting      Vital Signs Last 24 Hrs  T(C): 36.5, Max: 36.8 (05-04 @ 15:55)  T(F): 97.7, Max: 98.2 (05-04 @ 15:55)  HR: 65 (64 - 75)  BP: 151/64 (110/60 - 171/93)  BP(mean): --  RR: 12 (12 - 18)  SpO2: 100% (98% - 100%)    PE  Gen: NAD, AAOx3  Pulm: CTAB  CV: RRR, normal intensity s1/s2  Abd: soft, nontender, nondistended, biliary drain in place with bile in bag  Ext: moving all extremities  Neuro: GCS 15      I&O's Detail    I & Os for current day (as of 05 May 2017 13:04)  =============================================  IN:    Total IN: 0 ml  ---------------------------------------------  OUT:    Total OUT: 0 ml  ---------------------------------------------  Total NET: 0 ml      LABS:                        12.5   7.0   )-----------( 205      ( 05 May 2017 08:13 )             36.0     05-05    135  |  98  |  22.0<H>  ----------------------------<  136<H>  4.1   |  25.0  |  0.51    Ca    9.0      05 May 2017 08:13    TPro  6.8  /  Alb  3.2<L>  /  TBili  7.5<H>  /  DBili  x   /  AST  366<H>  /  ALT  379<H>  /  AlkPhos  768<H>  05-05    PT/INR - ( 04 May 2017 07:14 )   PT: 15.7 sec;   INR: 1.42 ratio SUBJECTIVE:   84 yo f w/ 25 mm uncinate mass w distal CBD obstruction and periampullary diverticulum (mucosal biopsy taken) s/p EUS, now s/p percutaneous biliary drain placement on 5/5 by IR; surgery was called for incidental findings of small retroperitoneal air seen on CT. Pt otherwise with no complaints of abdominal pain and tolerating diet with no nausea/ no vomiting at this time. Has been afebrile HD normal. on labs no leukocytosis; LFT's/Tbili trending down.      MEDICATIONS  (STANDING):  levothyroxine 50MICROGram(s) Oral daily  pantoprazole    Tablet 40milliGRAM(s) Oral before breakfast  multivitamin 1Tablet(s) Oral daily  flecainide 50milliGRAM(s) Oral two times a day  lactobacillus acidophilus 1Tablet(s) Oral two times a day with meals  metoprolol succinate ER 50milliGRAM(s) Oral daily  amLODIPine   Tablet 10milliGRAM(s) Oral daily  levoFLOXacin IVPB 500milliGRAM(s) IV Intermittent every 24 hours  sodium chloride 0.9%. 1000milliLiter(s) IV Continuous <Continuous>    MEDICATIONS  (PRN):  acetaminophen   Tablet 650milliGRAM(s) Oral every 6 hours PRN For Temp greater than 38 C (100.4 F)  hydrALAZINE Injectable 10milliGRAM(s) IV Push every 6 hours PRN SBP>180, DBP>95  ibuprofen  Tablet 400milliGRAM(s) Oral every 6 hours PRN pain  fentaNYL    Injectable 50MICROGram(s) IV Push every 5 minutes PRN Severe Pain  HYDROmorphone  Injectable 0.5milliGRAM(s) IV Push every 10 minutes PRN Moderate Pain  ondansetron Injectable 4milliGRAM(s) IV Push once PRN Nausea and/or Vomiting      Vital Signs Last 24 Hrs  T(C): 36.5, Max: 36.8 (05-04 @ 15:55)  T(F): 97.7, Max: 98.2 (05-04 @ 15:55)  HR: 65 (64 - 75)  BP: 151/64 (110/60 - 171/93)  BP(mean): --  RR: 12 (12 - 18)  SpO2: 100% (98% - 100%)    PE  Gen: NAD, AAOx3  Pulm: CTAB  CV: RRR, normal intensity s1/s2  Abd: soft, minimal epigastric ttp, nondistended, biliary drain in place with bile in bag  Ext: moving all extremities  Neuro: GCS 15      I&O's Detail    I & Os for current day (as of 05 May 2017 13:04)  =============================================  IN:    Total IN: 0 ml  ---------------------------------------------  OUT:    Total OUT: 0 ml  ---------------------------------------------  Total NET: 0 ml      LABS:                        12.5   7.0   )-----------( 205      ( 05 May 2017 08:13 )             36.0     05-05    135  |  98  |  22.0<H>  ----------------------------<  136<H>  4.1   |  25.0  |  0.51    Ca    9.0      05 May 2017 08:13    TPro  6.8  /  Alb  3.2<L>  /  TBili  7.5<H>  /  DBili  x   /  AST  366<H>  /  ALT  379<H>  /  AlkPhos  768<H>  05-05    PT/INR - ( 04 May 2017 07:14 )   PT: 15.7 sec;   INR: 1.42 ratio

## 2017-05-07 NOTE — PROGRESS NOTE ADULT - SUBJECTIVE AND OBJECTIVE BOX
No c/o  AVSS  TB decreasing    Exam    abd soft, NT/ND  bile clear    A/P    Panc head mass  Dr. Holder to discuss surgical plan  Cont. PTC to SD  Advance diet  OOB/IS

## 2017-05-07 NOTE — PROGRESS NOTE ADULT - ASSESSMENT
85 year old female with ESBL Klebsiella pneumonia UTI (Cystitis), without any evidence of sepsis or pyelonephritis. Given sensitivity patterns needs to be on IV antibiotics. Urine culture obtained- shows GNR. No bacteremia. Clinically improving. Completed 7 days IV Invanz. Asymptomatic.    CT abdomen (04/28/17) without any evidence of stone or pyelonephritis. Does show biliary ductal dilatation and mass in area of pancreatic head. Elevated transaminases as well as slightly elevated serum Lipase. Worsening LFT now with jaundice. Elevated CA 19.9 158 concerning for malignancy. ERCP attempted on 05/04/17 - unsuccessful, however FNA mass taken.  Underwent Percutaneous biliary drainage by IR 05/05/17. LFT downtrending    HTN  uncontrolled at admission - improved.    Chronic atrial fibrillation is rate controlled and INR downtrending. TTE shows moderate AS.    Hypothyroidism, clinically stable. TSH - 4.2, normal.

## 2017-05-07 NOTE — PROGRESS NOTE ADULT - ASSESSMENT
84 yo f w/ 25 mm uncinate mass w distal CBD obstruction and periampullary diverticulum (mucosal biopsy taken) s/p EUS, now s/p percutaneous biliary drain placement on 5/5 by IR; surgery was called for incidental findings of small retroperitoneal air seen on CT. Pt otherwise with no complaints of abdominal pain and tolerating diet with no nausea/ no vomiting at this time. Has been afebrile HD normal. on labs no leukocytosis; LFT's/Tbili trending down.

## 2017-05-07 NOTE — PROGRESS NOTE ADULT - SUBJECTIVE AND OBJECTIVE BOX
INTERVAL HPI/OVERNIGHT EVENTS:  Patient seen and examined    MEDICATIONS  (STANDING):  levothyroxine 50MICROGram(s) Oral daily  pantoprazole    Tablet 40milliGRAM(s) Oral before breakfast  multivitamin 1Tablet(s) Oral daily  flecainide 50milliGRAM(s) Oral two times a day  lactobacillus acidophilus 1Tablet(s) Oral two times a day with meals  metoprolol succinate ER 50milliGRAM(s) Oral daily  amLODIPine   Tablet 10milliGRAM(s) Oral daily  levoFLOXacin IVPB 500milliGRAM(s) IV Intermittent every 24 hours  enoxaparin Injectable 40milliGRAM(s) SubCutaneous daily    MEDICATIONS  (PRN):  acetaminophen   Tablet 650milliGRAM(s) Oral every 6 hours PRN For Temp greater than 38 C (100.4 F)  hydrALAZINE Injectable 10milliGRAM(s) IV Push every 6 hours PRN SBP>180, DBP>95  ibuprofen  Tablet 400milliGRAM(s) Oral every 6 hours PRN pain      Allergies    No Known Allergies    Intolerances        Vital Signs Last 24 Hrs  T(C): 37, Max: 37 (05-07 @ 09:00)  T(F): 98.6, Max: 98.6 (05-07 @ 09:00)  HR: 73 (68 - 73)  BP: 145/76 (119/75 - 145/76)  BP(mean): --  RR: 18 (18 - 18)  SpO2: 95% (95% - 97%)    PHYSICAL EXAM:  General: NAD.  CVS: S1, S2  Chest: air entry bilaterally present  Abd: BS present, soft, non-tender      LABS:                        12.0   7.4   )-----------( 167      ( 07 May 2017 08:53 )             35.5     05-07    142  |  100  |  17.0  ----------------------------<  96  3.8   |  29.0  |  0.59    Ca    9.1      07 May 2017 08:53    TPro  6.6  /  Alb  3.2<L>  /  TBili  3.1<H>  /  DBili  x   /  AST  74<H>  /  ALT  199<H>  /  AlkPhos  525<H>  05-07    PT/INR - ( 07 May 2017 08:53 )   PT: 15.3 sec;   INR: 1.38 ratio         PTT - ( 07 May 2017 08:53 )  PTT:33.4 sec

## 2017-05-07 NOTE — PROVIDER CONTACT NOTE (OTHER) - SITUATION
LISA RN assessed insertion point of percutaneous biliary drain, saturated with moderate amount of dried old sanguinous drainage, YAN Corley aware.

## 2017-05-07 NOTE — PROGRESS NOTE ADULT - SUBJECTIVE AND OBJECTIVE BOX
HEALTH ISSUES - PROBLEM Dx:  HPI:  85 year old female with PMHx HTN, Chronic atrial fibrillation s/p PPM and UTI presents with dysuria and chills. Denies any fever, but does have urinary incontinence as well and occasional lower abdominal and lower back pain. Denies any headaches but gets occasional dizziness. She also gets sore throat attributable to post nasal drip.  She was treated for a UTI with oral antibiotics recently and was told to go to the ER today because of resistant bacteria. Outpatient urine culture with >100k CFU ESBL Klebsiella pneumonia sensitive only to Carbapenems, Aminoglycosides and Zosyn.  Denies any history of nephrolithiasis, but does get recurrent UTIs.    She lives independently and uses a walker. (26 Apr 2017 21:29)    NOW  obstr jaundice, panc mass, s/p Pcholecystostomy, afib, esbl bacteremia    INTERVAL HPI/ OVERNIGHT EVENTS:  Overnight Ct abd was done which showed possible perf duodenal sec to findings of retroperitoneal air. per surg no intervention npo.  denies chest pain, nausea, vomits, cough, SOB, fever, HA    MEDICATIONS  (STANDING):  levothyroxine 50MICROGram(s) Oral daily  pantoprazole    Tablet 40milliGRAM(s) Oral before breakfast  multivitamin 1Tablet(s) Oral daily  flecainide 50milliGRAM(s) Oral two times a day  lactobacillus acidophilus 1Tablet(s) Oral two times a day with meals  metoprolol succinate ER 50milliGRAM(s) Oral daily  amLODIPine   Tablet 10milliGRAM(s) Oral daily  levoFLOXacin IVPB 500milliGRAM(s) IV Intermittent every 24 hours  enoxaparin Injectable 40milliGRAM(s) SubCutaneous daily    MEDICATIONS  (PRN):  acetaminophen   Tablet 650milliGRAM(s) Oral every 6 hours PRN For Temp greater than 38 C (100.4 F)  hydrALAZINE Injectable 10milliGRAM(s) IV Push every 6 hours PRN SBP>180, DBP>95  ibuprofen  Tablet 400milliGRAM(s) Oral every 6 hours PRN pain      Allergies    No Known Allergies    Intolerances        Vital Signs Last 24 Hrs  T(C): 37, Max: 37 (05-07 @ 09:00)  T(F): 98.6, Max: 98.6 (05-07 @ 09:00)  HR: 73 (68 - 73)  BP: 145/76 (119/75 - 145/76)  BP(mean): --  RR: 18 (18 - 18)  SpO2: 95% (95% - 97%)    ACCUCHECKS    PHYSICAL EXAM-  General: NAD[+]   nontoxic[+]   lying in bed, comfortable no pain, no N/V, had a good BM yesterday  HEENT: AT/NC[+]    NECK: Supple[+]  Carotid Bruit[+]  CVS:   Irregular[+]  S1+S2[+]   Murmur[+]  RESP: Fair air entry bilaterally[+]   Clear sounds[+]    GI: Soft[+]  Nondistended[+]   Minimally Tenderness lower abdomen  - improved  Bowel Sounds[+]  Biliary drain in epigastric region[+]  : suprapubic tenderness[+/-]   CVA Tenderness[-]   Andrea[-]  MS: FROM[+]   Edema[-]  CNS: AAOx3[+]    No gross deficit appreciated  INTEG: Skin is Warm[+]  dry[+] Jaundiced  PSYCH: Fair Mood, Affect      LABS:                        12.0   7.4   )-----------( 167      ( 07 May 2017 08:53 )             35.5     05-07    142  |  100  |  17.0  ----------------------------<  96  3.8   |  29.0  |  0.59    Ca    9.1      07 May 2017 08:53    TPro  6.6  /  Alb  3.2<L>  /  TBili  3.1<H>  /  DBili  x   /  AST  74<H>  /  ALT  199<H>  /  AlkPhos  525<H>  05-07    PT/INR - ( 07 May 2017 08:53 )   PT: 15.3 sec;   INR: 1.38 ratio         PTT - ( 07 May 2017 08:53 )  PTT:33.4 sec    RADIOLOGY & ADDITIONAL TESTS:    Assessment and Plan  DVT Prophylaxis    Discussed with: Patient, family, RN, CM, Consultants  Plan of care/ Discharge planning discussed.    Visit Time:

## 2017-05-07 NOTE — PROGRESS NOTE ADULT - PROBLEM SELECTOR PLAN 1
- No surgical intervention required at this time  - diet as tolerated   - please call [facility] with questions or concerns    Discussed with surgical attending. - NPO/IVF for now  - serial exams  - No surgical interventions at this time  - surgery will follow    Discussed with surgical attending.

## 2017-05-08 ENCOUNTER — TRANSCRIPTION ENCOUNTER (OUTPATIENT)
Age: 82
End: 2017-05-08

## 2017-05-08 LAB
ALBUMIN SERPL ELPH-MCNC: 3 G/DL — LOW (ref 3.3–5.2)
ALP SERPL-CCNC: 470 U/L — HIGH (ref 40–120)
ALT FLD-CCNC: 140 U/L — HIGH
ANION GAP SERPL CALC-SCNC: 14 MMOL/L — SIGNIFICANT CHANGE UP (ref 5–17)
AST SERPL-CCNC: 47 U/L — HIGH
BILIRUB SERPL-MCNC: 2.6 MG/DL — HIGH (ref 0.4–2)
BUN SERPL-MCNC: 16 MG/DL — SIGNIFICANT CHANGE UP (ref 8–20)
CALCIUM SERPL-MCNC: 8.8 MG/DL — SIGNIFICANT CHANGE UP (ref 8.6–10.2)
CHLORIDE SERPL-SCNC: 99 MMOL/L — SIGNIFICANT CHANGE UP (ref 98–107)
CO2 SERPL-SCNC: 28 MMOL/L — SIGNIFICANT CHANGE UP (ref 22–29)
CREAT SERPL-MCNC: 0.52 MG/DL — SIGNIFICANT CHANGE UP (ref 0.5–1.3)
GLUCOSE SERPL-MCNC: 120 MG/DL — HIGH (ref 70–115)
POTASSIUM SERPL-MCNC: 3.8 MMOL/L — SIGNIFICANT CHANGE UP (ref 3.5–5.3)
POTASSIUM SERPL-SCNC: 3.8 MMOL/L — SIGNIFICANT CHANGE UP (ref 3.5–5.3)
PROT SERPL-MCNC: 6.5 G/DL — LOW (ref 6.6–8.7)
SODIUM SERPL-SCNC: 141 MMOL/L — SIGNIFICANT CHANGE UP (ref 135–145)
SURGICAL PATHOLOGY FINAL REPORT - CH: SIGNIFICANT CHANGE UP

## 2017-05-08 PROCEDURE — 99233 SBSQ HOSP IP/OBS HIGH 50: CPT

## 2017-05-08 RX ORDER — ACETAMINOPHEN 500 MG
2 TABLET ORAL
Qty: 0 | Refills: 0 | COMMUNITY
Start: 2017-05-08

## 2017-05-08 RX ORDER — RABEPRAZOLE 20 MG/1
1 TABLET, DELAYED RELEASE ORAL
Qty: 0 | Refills: 0 | COMMUNITY

## 2017-05-08 RX ORDER — TROSPIUM CHLORIDE 20 MG/1
1 TABLET, FILM COATED ORAL
Qty: 0 | Refills: 0 | COMMUNITY

## 2017-05-08 RX ORDER — PANTOPRAZOLE SODIUM 20 MG/1
1 TABLET, DELAYED RELEASE ORAL
Qty: 40 | Refills: 0 | OUTPATIENT
Start: 2017-05-08

## 2017-05-08 RX ORDER — NITROFURANTOIN MACROCRYSTAL 50 MG
1 CAPSULE ORAL
Qty: 0 | Refills: 0 | COMMUNITY

## 2017-05-08 RX ORDER — ESTRADIOL 4 UG/1
1 INSERT VAGINAL
Qty: 0 | Refills: 0 | COMMUNITY

## 2017-05-08 RX ORDER — METOPROLOL TARTRATE 50 MG
1 TABLET ORAL
Qty: 30 | Refills: 0 | OUTPATIENT
Start: 2017-05-08

## 2017-05-08 RX ORDER — AMLODIPINE BESYLATE 2.5 MG/1
1 TABLET ORAL
Qty: 30 | Refills: 0 | OUTPATIENT
Start: 2017-05-08

## 2017-05-08 RX ADMIN — PANTOPRAZOLE SODIUM 40 MILLIGRAM(S): 20 TABLET, DELAYED RELEASE ORAL at 06:03

## 2017-05-08 RX ADMIN — AMLODIPINE BESYLATE 10 MILLIGRAM(S): 2.5 TABLET ORAL at 06:02

## 2017-05-08 RX ADMIN — Medication 1 TABLET(S): at 06:03

## 2017-05-08 RX ADMIN — Medication 50 MILLIGRAM(S): at 06:02

## 2017-05-08 RX ADMIN — ENOXAPARIN SODIUM 40 MILLIGRAM(S): 100 INJECTION SUBCUTANEOUS at 21:07

## 2017-05-08 RX ADMIN — Medication 1 TABLET(S): at 17:48

## 2017-05-08 RX ADMIN — Medication 50 MILLIGRAM(S): at 17:48

## 2017-05-08 RX ADMIN — Medication 1 TABLET(S): at 12:33

## 2017-05-08 RX ADMIN — Medication 50 MICROGRAM(S): at 06:02

## 2017-05-08 NOTE — DISCHARGE NOTE ADULT - MEDICATION SUMMARY - MEDICATIONS TO TAKE
I will START or STAY ON the medications listed below when I get home from the hospital:    acetaminophen 325 mg oral tablet  -- 2 tab(s) by mouth every 6 hours, As needed, For Temp greater than 38 C (100.4 F)  -- Indication: For fever    flecainide 50 mg oral tablet  -- 1 tab(s) by mouth 2 times a day  -- Indication: For heart rate    Coumadin 5 mg oral tablet  -- 1 tab(s) by mouth once a day on Tues, Thur, Sat, Sun  -- Indication: For blood thinner    Coumadin 2.5 mg oral tablet  -- 1 tab(s) by mouth once a day on Mon, Wed, Fri  -- Indication: For blood thinner    metoprolol succinate 50 mg oral tablet, extended release  -- 1 tab(s) by mouth once a day  -- Indication: For heart rate    amLODIPine 10 mg oral tablet  -- 1 tab(s) by mouth once a day  -- Indication: For blood pressure    pantoprazole 40 mg oral delayed release tablet  -- 1 tab(s) by mouth once a day (before a meal)  -- Indication: For Gastroesophageal reflux disease without esophagitis    levothyroxine 50 mcg (0.05 mg) oral tablet  -- 1 tab(s) by mouth once a day  -- Indication: For thyroid    PreserVision AREDS 2 oral capsule  -- 2 cap(s) by mouth once a day  -- Indication: For Eye    multivitamin  -- 1 tab(s) by mouth once a day  -- Indication: For vitamin

## 2017-05-08 NOTE — DISCHARGE NOTE ADULT - CARE PROVIDERS DIRECT ADDRESSES
,DirectAddress_Unknown,[email or web address],DirectAddress_Unknown,DirectAddress_Unknown

## 2017-05-08 NOTE — DISCHARGE NOTE ADULT - PROVIDER TOKENS
FREE:[LAST:[selgrad PMD],PHONE:[(   )    -],FAX:[(   )    -]],TOKEN:'[id number]',TOKEN:'[id number]',TOKEN:'[id number]'

## 2017-05-08 NOTE — DISCHARGE NOTE ADULT - MEDICATION SUMMARY - MEDICATIONS TO CHANGE
I will SWITCH the dose or number of times a day I take the medications listed below when I get home from the hospital:    Toprol-XL 25 mg oral tablet, extended release  -- 0.5 tab(s) by mouth once a day (at bedtime)

## 2017-05-08 NOTE — DISCHARGE NOTE ADULT - PATIENT PORTAL LINK FT
“You can access the Follow[facility] Patient Portal, offered by [facility], by registering with the following website: [email or web address]”

## 2017-05-08 NOTE — PHYSICAL THERAPY INITIAL EVALUATION ADULT - CRITERIA FOR SKILLED THERAPEUTIC INTERVENTIONS
anticipated equipment needs at discharge/anticipated discharge recommendation/functional limitations in following categories/rehab potential/risk reduction/prevention/therapy frequency/impairments found

## 2017-05-08 NOTE — PHYSICAL THERAPY INITIAL EVALUATION ADULT - ADDITIONAL COMMENTS
pt states she lives alone in a 1st floor apartment [facility] 2 platform steps to enter. she admittedly "furniture surfs" at home, but has a RW and rollator for when she goes out. has a shower chair and commode. was independent prior to admit and driving.

## 2017-05-08 NOTE — PROGRESS NOTE ADULT - SUBJECTIVE AND OBJECTIVE BOX
HEALTH ISSUES - PROBLEM Dx:  HPI:  85 year old female with PMHx HTN, Chronic atrial fibrillation s/p PPM and UTI presents with dysuria and chills. Denies any fever, but does have urinary incontinence as well and occasional lower abdominal and lower back pain. Denies any headaches but gets occasional dizziness. She also gets sore throat attributable to post nasal drip.  She was treated for a UTI with oral antibiotics recently and was told to go to the ER today because of resistant bacteria. Outpatient urine culture with >100k CFU ESBL Klebsiella pneumonia sensitive only to Carbapenems, Aminoglycosides and Zosyn.  Denies any history of nephrolithiasis, but does get recurrent UTIs.    She lives independently and uses a walker. (26 Apr 2017 21:29)    NOW  uti, biliary drain, panc mass    INTERVAL HPI/ OVERNIGHT EVENTS:  tolerating PO feeds, comfortbale  denies chest pain, nausea, vomits, cough, SOB, fever, HA    MEDICATIONS  (STANDING):  levothyroxine 50MICROGram(s) Oral daily  pantoprazole    Tablet 40milliGRAM(s) Oral before breakfast  multivitamin 1Tablet(s) Oral daily  flecainide 50milliGRAM(s) Oral two times a day  lactobacillus acidophilus 1Tablet(s) Oral two times a day with meals  metoprolol succinate ER 50milliGRAM(s) Oral daily  amLODIPine   Tablet 10milliGRAM(s) Oral daily  levoFLOXacin IVPB 500milliGRAM(s) IV Intermittent every 24 hours  enoxaparin Injectable 40milliGRAM(s) SubCutaneous daily    MEDICATIONS  (PRN):  acetaminophen   Tablet 650milliGRAM(s) Oral every 6 hours PRN For Temp greater than 38 C (100.4 F)  hydrALAZINE Injectable 10milliGRAM(s) IV Push every 6 hours PRN SBP>180, DBP>95  ibuprofen  Tablet 400milliGRAM(s) Oral every 6 hours PRN pain      Allergies    No Known Allergies    Intolerances        Vital Signs Last 24 Hrs  T(C): 36.5, Max: 37.1 (05-07 @ 21:10)  T(F): 97.7, Max: 98.8 (05-07 @ 21:10)  HR: 92 (74 - 113)  BP: 122/84 (115/77 - 136/70)  BP(mean): --  RR: 18 (18 - 20)  SpO2: 98% (94% - 98%)    ACCUCHECKS    PHYSICAL EXAM-  General: NAD[+]   nontoxic[+]   lying in bed, comfortable no pain, no N/V, had a good BM yesterday  HEENT: AT/NC[+]    NECK: Supple[+]  Carotid Bruit[+]  CVS:   Irregular[+]  S1+S2[+]   Murmur[+]  RESP: Fair air entry bilaterally[+]   Clear sounds[+]    GI: Soft[+]  Nondistended[+]   Minimally Tenderness lower abdomen  - improved  Bowel Sounds[+]  Biliary drain in epigastric region[+]  : suprapubic tenderness[+/-]   CVA Tenderness[-]   Andrea[-]  MS: FROM[+]   Edema[-]  CNS: AAOx3[+]    No gross deficit appreciated  INTEG: Skin is Warm[+]  dry[+] Jaundiced  PSYCH: Fair Mood, Affect      LABS:                        12.0   7.4   )-----------( 167      ( 07 May 2017 08:53 )             35.5     05-08    141  |  99  |  16.0  ----------------------------<  120<H>  3.8   |  28.0  |  0.52    Ca    8.8      08 May 2017 09:03    TPro  6.5<L>  /  Alb  3.0<L>  /  TBili  2.6<H>  /  DBili  x   /  AST  47<H>  /  ALT  140<H>  /  AlkPhos  470<H>  05-08    PT/INR - ( 07 May 2017 08:53 )   PT: 15.3 sec;   INR: 1.38 ratio         PTT - ( 07 May 2017 08:53 )  PTT:33.4 sec    RADIOLOGY & ADDITIONAL TESTS:    Assessment and Plan  DVT Prophylaxis    Discussed with: Patient, family, RN, CM, Consultants  Plan of care/ Discharge planning discussed.    Visit Time:

## 2017-05-08 NOTE — DISCHARGE NOTE ADULT - CARE PROVIDER_API CALL
kimberly HOSKINS,   Phone: (   )    -  Fax: (   )    -    Refugio Holder), Surgery; Surgical Oncology  [address] [place] [address]  Phone: [phone]  Fax: [phone]    Reji Qiu), Gastroenterology  [address] [place] [address] [place] [address]  Phone: [phone]  Fax: [phone]    Rylan Mederos), Cardiovascular Disease; Internal Medicine  [address] [place] [address]  Phone: [phone]  Fax: [phone]

## 2017-05-08 NOTE — DISCHARGE NOTE ADULT - MEDICATION SUMMARY - MEDICATIONS TO STOP TAKING
I will STOP taking the medications listed below when I get home from the hospital:    saccharomyces boulardii lyo 250 mg oral capsule  -- 1 cap(s) by mouth 2 times a day    ciprofloxacin 250 mg oral tablet  -- 1 tab(s) by mouth 2 times a day

## 2017-05-08 NOTE — PHYSICAL THERAPY INITIAL EVALUATION ADULT - PERTINENT HX OF CURRENT PROBLEM, REHAB EVAL
85 year old female with ESBL Klebsiella pneumonia UTI (Cystitis), without any evidence of sepsis or pyelonephritis. Given sensitivity patterns needs to be on IV antibiotics.; now found to have a mass

## 2017-05-08 NOTE — DISCHARGE NOTE ADULT - HOSPITAL COURSE
85 year old female with PMHx HTN, Chronic atrial fibrillation s/p PPM and UTI presents with dysuria and chills. ESBL Klebsiella pneumonia. Elevated CA 19.9 158 concerning for malignancy. ERCP attempted on 05/04/17 - unsuccessful, however FNA mass taken. Underwent Percutaneous biliary drainage by IR 05/05/17. LFT downtrending. To follow with Surg onc and bx results outpatient. Continue coumadin until scheduled for surgery. Medically stable and agreeable with discharge and follow up plan. Patient was advised to return to ED if any symptoms occur or worsen.

## 2017-05-08 NOTE — DISCHARGE NOTE ADULT - CARE PLAN
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

## 2017-05-08 NOTE — DISCHARGE NOTE ADULT - SECONDARY DIAGNOSIS.
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

## 2017-05-09 VITALS
RESPIRATION RATE: 18 BRPM | HEART RATE: 76 BPM | TEMPERATURE: 99 F | SYSTOLIC BLOOD PRESSURE: 128 MMHG | DIASTOLIC BLOOD PRESSURE: 84 MMHG | OXYGEN SATURATION: 98 %

## 2017-05-09 PROCEDURE — 99239 HOSP IP/OBS DSCHRG MGMT >30: CPT

## 2017-05-09 RX ORDER — PANTOPRAZOLE SODIUM 20 MG/1
1 TABLET, DELAYED RELEASE ORAL
Qty: 40 | Refills: 0 | OUTPATIENT
Start: 2017-05-09

## 2017-05-09 RX ORDER — METOPROLOL TARTRATE 50 MG
1 TABLET ORAL
Qty: 30 | Refills: 0 | OUTPATIENT
Start: 2017-05-09

## 2017-05-09 RX ORDER — AMLODIPINE BESYLATE 2.5 MG/1
1 TABLET ORAL
Qty: 30 | Refills: 0 | OUTPATIENT
Start: 2017-05-09

## 2017-05-09 RX ADMIN — Medication 50 MILLIGRAM(S): at 05:30

## 2017-05-09 RX ADMIN — Medication 1 TABLET(S): at 12:12

## 2017-05-09 RX ADMIN — Medication 50 MICROGRAM(S): at 05:30

## 2017-05-09 RX ADMIN — AMLODIPINE BESYLATE 10 MILLIGRAM(S): 2.5 TABLET ORAL at 05:29

## 2017-05-09 RX ADMIN — PANTOPRAZOLE SODIUM 40 MILLIGRAM(S): 20 TABLET, DELAYED RELEASE ORAL at 05:35

## 2017-05-09 RX ADMIN — Medication 50 MILLIGRAM(S): at 05:29

## 2017-05-09 RX ADMIN — Medication 1 TABLET(S): at 07:15

## 2017-05-09 NOTE — PROGRESS NOTE ADULT - PROBLEM SELECTOR PROBLEM 1
ESBL (extended spectrum beta-lactamase) producing bacteria infection
Pancreatic mass

## 2017-05-09 NOTE — PROGRESS NOTE ADULT - PROBLEM SELECTOR PLAN 9
suggested likely due to duodenal perf. surg was called overnight- as pt asymptomatic no surgical intervention  just NPO. d/w Gi- who agrees with starting on clears PO. tolerating well. plan to d/c home
suggested likely due to duodenal perf. surg was called overnight- as pt asymptomatic no surgical intervention  just NPO. d/w Gi- who agrees with starting on clears PO. tolerating well. plan to d/c home
suggested likely due to duodenal perf. surg was called overnight- as pt asymptomatic no surgical intervention  just NPO. d/w Gi- who agrees with starting on clears PO.

## 2017-05-09 NOTE — PROGRESS NOTE ADULT - PROBLEM SELECTOR PROBLEM 7
ESBL (extended spectrum beta-lactamase) producing bacteria infection
Gastroesophageal reflux disease without esophagitis
Prophylactic measure

## 2017-05-09 NOTE — PROGRESS NOTE ADULT - SUBJECTIVE AND OBJECTIVE BOX
HEALTH ISSUES - PROBLEM Dx:  HPI:  85 year old female with PMHx HTN, Chronic atrial fibrillation s/p PPM and UTI presents with dysuria and chills. Denies any fever, but does have urinary incontinence as well and occasional lower abdominal and lower back pain. Denies any headaches but gets occasional dizziness. She also gets sore throat attributable to post nasal drip.  She was treated for a UTI with oral antibiotics recently and was told to go to the ER today because of resistant bacteria. Outpatient urine culture with >100k CFU ESBL Klebsiella pneumonia sensitive only to Carbapenems, Aminoglycosides and Zosyn.  Denies any history of nephrolithiasis, but does get recurrent UTIs.    She lives independently and uses a walker. (26 Apr 2017 21:29)    NOW  uti, biliary drain, panc mass      INTERVAL HPI/ OVERNIGHT EVENTS:  awaits d/c home with home care  denies chest pain, nausea, vomits, cough, SOB, fever, HA    MEDICATIONS  (STANDING):  levothyroxine 50MICROGram(s) Oral daily  pantoprazole    Tablet 40milliGRAM(s) Oral before breakfast  multivitamin 1Tablet(s) Oral daily  flecainide 50milliGRAM(s) Oral two times a day  lactobacillus acidophilus 1Tablet(s) Oral two times a day with meals  metoprolol succinate ER 50milliGRAM(s) Oral daily  amLODIPine   Tablet 10milliGRAM(s) Oral daily  levoFLOXacin IVPB 500milliGRAM(s) IV Intermittent every 24 hours  enoxaparin Injectable 40milliGRAM(s) SubCutaneous daily    MEDICATIONS  (PRN):  acetaminophen   Tablet 650milliGRAM(s) Oral every 6 hours PRN For Temp greater than 38 C (100.4 F)  hydrALAZINE Injectable 10milliGRAM(s) IV Push every 6 hours PRN SBP>180, DBP>95  ibuprofen  Tablet 400milliGRAM(s) Oral every 6 hours PRN pain      Allergies    No Known Allergies    Intolerances        Vital Signs Last 24 Hrs  T(C): 36.6, Max: 36.6 (05-08 @ 23:58)  T(F): 97.8, Max: 97.8 (05-08 @ 23:58)  HR: 63 (63 - 71)  BP: 133/81 (109/69 - 151/89)  BP(mean): --  RR: 18 (18 - 18)  SpO2: 98% (98% - 98%)    ACCUCHECKS    PHYSICAL EXAM-  General: NAD[+]   nontoxic[+]   lying in bed, comfortable no pain, no N/V, had a good BM yesterday  HEENT: AT/NC[+]    NECK: Supple[+]  Carotid Bruit[+]  CVS:   Irregular[+]  S1+S2[+]   Murmur[+]  RESP: Fair air entry bilaterally[+]   Clear sounds[+]    GI: Soft[+]  Nondistended[+]   Minimally Tenderness lower abdomen  - improved  Bowel Sounds[+]  Biliary drain in epigastric region[+]  : suprapubic tenderness[+/-]   CVA Tenderness[-]   Andrea[-]  MS: FROM[+]   Edema[-]  CNS: AAOx3[+]    No gross deficit appreciated  INTEG: Skin is Warm[+]  dry[+] Jaundiced  PSYCH: Fair Mood, Affect      LABS:    05-08    141  |  99  |  16.0  ----------------------------<  120<H>  3.8   |  28.0  |  0.52    Ca    8.8      08 May 2017 09:03    TPro  6.5<L>  /  Alb  3.0<L>  /  TBili  2.6<H>  /  DBili  x   /  AST  47<H>  /  ALT  140<H>  /  AlkPhos  470<H>  05-08        RADIOLOGY & ADDITIONAL TESTS:    Assessment and Plan  DVT Prophylaxis    Discussed with: Patient, family, RN, CM, Consultants  Plan of care/ Discharge planning discussed.    Visit Time:

## 2017-05-09 NOTE — PROGRESS NOTE ADULT - PROBLEM SELECTOR PLAN 7
Completed IV antibiotics x 7 days ending 05/02/17
Continue PPI
VTE- VCD  Probiotic  PT
VTE- Therapeutic INR  Probiotic  PT

## 2017-05-09 NOTE — PROGRESS NOTE ADULT - PROBLEM SELECTOR PLAN 8
VTE- VCD, add Lovenox  Probiotic  PT
VTE- VCD  Probiotic  PT

## 2017-05-09 NOTE — PROGRESS NOTE ADULT - PROVIDER SPECIALTY LIST ADULT
Anesthesia
Cardiology
Cardiology
Gastroenterology
Hospitalist
Infectious Disease
Infectious Disease
Internal Medicine
Internal Medicine
Surgery
Internal Medicine
Surgery

## 2017-05-10 LAB
CULTURE RESULTS: SIGNIFICANT CHANGE UP
ORGANISM # SPEC MICROSCOPIC CNT: SIGNIFICANT CHANGE UP
ORGANISM # SPEC MICROSCOPIC CNT: SIGNIFICANT CHANGE UP
SPECIMEN SOURCE: SIGNIFICANT CHANGE UP

## 2017-05-11 LAB — CYTOLOGY FNA REPORT: SIGNIFICANT CHANGE UP

## 2017-05-16 ENCOUNTER — APPOINTMENT (OUTPATIENT)
Dept: DERMATOLOGY | Facility: CLINIC | Age: 82
End: 2017-05-16

## 2017-05-18 ENCOUNTER — APPOINTMENT (OUTPATIENT)
Dept: SURGICAL ONCOLOGY | Facility: CLINIC | Age: 82
End: 2017-05-18

## 2017-05-18 VITALS
DIASTOLIC BLOOD PRESSURE: 81 MMHG | WEIGHT: 154 LBS | HEART RATE: 92 BPM | RESPIRATION RATE: 13 BRPM | SYSTOLIC BLOOD PRESSURE: 115 MMHG | OXYGEN SATURATION: 98 % | TEMPERATURE: 98.1 F | BODY MASS INDEX: 29.07 KG/M2 | HEIGHT: 61 IN

## 2017-05-18 DIAGNOSIS — Z80.1 FAMILY HISTORY OF MALIGNANT NEOPLASM OF TRACHEA, BRONCHUS AND LUNG: ICD-10-CM

## 2017-05-18 DIAGNOSIS — Z91.89 OTHER SPECIFIED PERSONAL RISK FACTORS, NOT ELSEWHERE CLASSIFIED: ICD-10-CM

## 2017-05-18 DIAGNOSIS — N39.0 URINARY TRACT INFECTION, SITE NOT SPECIFIED: ICD-10-CM

## 2017-05-18 DIAGNOSIS — Z82.49 FAMILY HISTORY OF ISCHEMIC HEART DISEASE AND OTHER DISEASES OF THE CIRCULATORY SYSTEM: ICD-10-CM

## 2017-05-18 DIAGNOSIS — Z87.898 PERSONAL HISTORY OF OTHER SPECIFIED CONDITIONS: ICD-10-CM

## 2017-05-18 DIAGNOSIS — Z78.9 OTHER SPECIFIED HEALTH STATUS: ICD-10-CM

## 2017-05-18 DIAGNOSIS — Z87.09 PERSONAL HISTORY OF OTHER DISEASES OF THE RESPIRATORY SYSTEM: ICD-10-CM

## 2017-05-18 DIAGNOSIS — I48.91 UNSPECIFIED ATRIAL FIBRILLATION: ICD-10-CM

## 2017-05-18 DIAGNOSIS — Z87.39 PERSONAL HISTORY OF OTHER DISEASES OF THE MUSCULOSKELETAL SYSTEM AND CONNECTIVE TISSUE: ICD-10-CM

## 2017-05-18 DIAGNOSIS — Z97.3 PRESENCE OF SPECTACLES AND CONTACT LENSES: ICD-10-CM

## 2017-05-18 DIAGNOSIS — Z80.3 FAMILY HISTORY OF MALIGNANT NEOPLASM OF BREAST: ICD-10-CM

## 2017-05-18 DIAGNOSIS — H35.30 UNSPECIFIED MACULAR DEGENERATION: ICD-10-CM

## 2017-05-18 DIAGNOSIS — I10 ESSENTIAL (PRIMARY) HYPERTENSION: ICD-10-CM

## 2017-05-18 DIAGNOSIS — E03.9 HYPOTHYROIDISM, UNSPECIFIED: ICD-10-CM

## 2017-05-18 DIAGNOSIS — Z82.3 FAMILY HISTORY OF STROKE: ICD-10-CM

## 2017-05-18 DIAGNOSIS — Z87.19 PERSONAL HISTORY OF OTHER DISEASES OF THE DIGESTIVE SYSTEM: ICD-10-CM

## 2017-05-18 DIAGNOSIS — Z83.49 FAMILY HISTORY OF OTHER ENDOCRINE, NUTRITIONAL AND METABOLIC DISEASES: ICD-10-CM

## 2017-05-18 DIAGNOSIS — Z16.12 BACTERIAL INFECTION, UNSPECIFIED: ICD-10-CM

## 2017-05-18 DIAGNOSIS — A49.9 BACTERIAL INFECTION, UNSPECIFIED: ICD-10-CM

## 2017-05-18 RX ORDER — WARFARIN SODIUM 5 MG/1
5 TABLET ORAL
Refills: 0 | Status: ACTIVE | COMMUNITY

## 2017-05-18 RX ORDER — MULTIVITAMIN
TABLET ORAL
Refills: 0 | Status: ACTIVE | COMMUNITY

## 2017-05-18 RX ORDER — FLECAINIDE ACETATE 50 MG/1
50 TABLET ORAL
Refills: 0 | Status: ACTIVE | COMMUNITY

## 2017-05-18 RX ORDER — LEVOTHYROXINE SODIUM 0.05 MG/1
50 TABLET ORAL
Refills: 0 | Status: ACTIVE | COMMUNITY

## 2017-05-18 RX ORDER — PANTOPRAZOLE 40 MG/1
40 TABLET, DELAYED RELEASE ORAL
Refills: 0 | Status: ACTIVE | COMMUNITY

## 2017-05-18 RX ORDER — AMLODIPINE BESYLATE 10 MG/1
10 TABLET ORAL
Refills: 0 | Status: ACTIVE | COMMUNITY

## 2017-05-18 RX ORDER — RABEPRAZOLE SODIUM 20 MG/1
20 TABLET, DELAYED RELEASE ORAL
Refills: 0 | Status: ACTIVE | COMMUNITY

## 2017-05-18 RX ORDER — VIT A/VIT C/VIT E/ZINC/COPPER 4296-226
CAPSULE ORAL
Refills: 0 | Status: ACTIVE | COMMUNITY

## 2017-05-18 RX ORDER — METOPROLOL SUCCINATE 50 MG/1
50 TABLET, EXTENDED RELEASE ORAL
Refills: 0 | Status: ACTIVE | COMMUNITY

## 2017-05-18 RX ORDER — ACETAMINOPHEN 325 MG/1
325 TABLET ORAL
Refills: 0 | Status: ACTIVE | COMMUNITY

## 2017-06-01 ENCOUNTER — APPOINTMENT (OUTPATIENT)
Dept: SURGICAL ONCOLOGY | Facility: CLINIC | Age: 82
End: 2017-06-01

## 2017-06-15 ENCOUNTER — APPOINTMENT (OUTPATIENT)
Dept: SURGICAL ONCOLOGY | Facility: HOSPITAL | Age: 82
End: 2017-06-15

## 2017-10-11 PROCEDURE — 74160 CT ABDOMEN W/CONTRAST: CPT

## 2017-10-11 PROCEDURE — 76942 ECHO GUIDE FOR BIOPSY: CPT

## 2017-10-11 PROCEDURE — 87186 SC STD MICRODIL/AGAR DIL: CPT

## 2017-10-11 PROCEDURE — 74178 CT ABD&PLV WO CNTR FLWD CNTR: CPT

## 2017-10-11 PROCEDURE — 87075 CULTR BACTERIA EXCEPT BLOOD: CPT

## 2017-10-11 PROCEDURE — 86880 COOMBS TEST DIRECT: CPT

## 2017-10-11 PROCEDURE — 83690 ASSAY OF LIPASE: CPT

## 2017-10-11 PROCEDURE — 93306 TTE W/DOPPLER COMPLETE: CPT

## 2017-10-11 PROCEDURE — C1769: CPT

## 2017-10-11 PROCEDURE — 86850 RBC ANTIBODY SCREEN: CPT

## 2017-10-11 PROCEDURE — 87086 URINE CULTURE/COLONY COUNT: CPT

## 2017-10-11 PROCEDURE — 36415 COLL VENOUS BLD VENIPUNCTURE: CPT

## 2017-10-11 PROCEDURE — 86901 BLOOD TYPING SEROLOGIC RH(D): CPT

## 2017-10-11 PROCEDURE — 96374 THER/PROPH/DIAG INJ IV PUSH: CPT

## 2017-10-11 PROCEDURE — 85027 COMPLETE CBC AUTOMATED: CPT

## 2017-10-11 PROCEDURE — 74330 X-RAY BILE/PANC ENDOSCOPY: CPT

## 2017-10-11 PROCEDURE — 97110 THERAPEUTIC EXERCISES: CPT

## 2017-10-11 PROCEDURE — 83605 ASSAY OF LACTIC ACID: CPT

## 2017-10-11 PROCEDURE — 80053 COMPREHEN METABOLIC PANEL: CPT

## 2017-10-11 PROCEDURE — 85610 PROTHROMBIN TIME: CPT

## 2017-10-11 PROCEDURE — 87040 BLOOD CULTURE FOR BACTERIA: CPT

## 2017-10-11 PROCEDURE — 80048 BASIC METABOLIC PNL TOTAL CA: CPT

## 2017-10-11 PROCEDURE — 81001 URINALYSIS AUTO W/SCOPE: CPT

## 2017-10-11 PROCEDURE — 74176 CT ABD & PELVIS W/O CONTRAST: CPT

## 2017-10-11 PROCEDURE — 86900 BLOOD TYPING SEROLOGIC ABO: CPT

## 2017-10-11 PROCEDURE — 97116 GAIT TRAINING THERAPY: CPT

## 2017-10-11 PROCEDURE — 88305 TISSUE EXAM BY PATHOLOGIST: CPT

## 2017-10-11 PROCEDURE — 88172 CYTP DX EVAL FNA 1ST EA SITE: CPT

## 2017-10-11 PROCEDURE — 85730 THROMBOPLASTIN TIME PARTIAL: CPT

## 2017-10-11 PROCEDURE — 87070 CULTURE OTHR SPECIMN AEROBIC: CPT

## 2017-10-11 PROCEDURE — 76000 FLUOROSCOPY <1 HR PHYS/QHP: CPT

## 2017-10-11 PROCEDURE — 88173 CYTOPATH EVAL FNA REPORT: CPT

## 2017-10-11 PROCEDURE — 86301 IMMUNOASSAY TUMOR CA 19-9: CPT

## 2017-10-11 PROCEDURE — 87205 SMEAR GRAM STAIN: CPT

## 2017-10-11 PROCEDURE — 84443 ASSAY THYROID STIM HORMONE: CPT

## 2017-10-11 PROCEDURE — 97163 PT EVAL HIGH COMPLEX 45 MIN: CPT

## 2017-10-11 PROCEDURE — 82150 ASSAY OF AMYLASE: CPT

## 2017-10-11 PROCEDURE — 71260 CT THORAX DX C+: CPT

## 2017-10-11 PROCEDURE — 86870 RBC ANTIBODY IDENTIFICATION: CPT

## 2017-10-11 PROCEDURE — 99285 EMERGENCY DEPT VISIT HI MDM: CPT | Mod: 25

## 2019-06-03 NOTE — ED ADULT NURSE NOTE - NS ED NOTE ABUSE SUSPICION NEGLECT YN
Hide Include Location In Plan Question?: No
Include Location In Plan?: Yes
Detail Level: Detailed
no

## 2019-08-02 NOTE — BRIEF OPERATIVE NOTE - TYPE OF ANESTHESIA
RT contacted for neb lido.   
Monitored Anesthesia Care with sedation
Monitored Anesthesia Care with sedation

## 2021-12-17 NOTE — PATIENT PROFILE ADULT. - TEACHING/LEARNING FACTORS INFLUENCE READINESS TO LEARN
Doxycycline Pregnancy And Lactation Text: This medication is Pregnancy Category D and not consider safe during pregnancy. It is also excreted in breast milk but is considered safe for shorter treatment courses. none

## 2023-09-08 NOTE — DISCHARGE NOTE ADULT - AN EXTRA PILL TO ‘CATCH UP.’ NEVER TAKE A DOUBLE DOSE. NOTIFY YOUR DOCTOR THAT YOU MISSED A DOSE. TAKE WARFARIN/COUMADIN IN THE EVENING AT THE SAME TIME.
Communicate fall risk and risk factors to all staff, patient, and family/Provide visual cue: yellow wristband, yellow gown, etc/Reinforce activity limits and safety measures with patient and family/Call bell, personal items and telephone in reach/Instruct patient to call for assistance before getting out of bed/chair/stretcher/Non-slip footwear applied when patient is off stretcher/[place] to call system/Physically safe environment - no spills, clutter or unnecessary equipment/Purposeful Proactive Rounding/Room/bathroom lighting operational, light cord in reach
Statement Selected

## 2023-12-11 NOTE — ED ADULT NURSE REASSESSMENT NOTE - TEMPLATE LIST FOR HEAD TO TOE ASSESSMENT
Spoke to patient about her urine culture.  Patient would like the Bactrim called in for the UTI to [facility] in [place].  Her coughing is much better.  She stated she occasionally coughs.  She stated she'll reach out to her GI doctor if she has problems.   VIEW ALL

## 2023-12-14 NOTE — CONSULT NOTE ADULT - CONSULT REQUESTED BY NAME
On call note.    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 Patient underwent MRI of the cervical & thoracic spine at [facility] and is requesting results and pain management.     MRI of thoracic spine, was sent to patient's PCP's office and revealed DDD with no spinal stenosis or neural foraminal stenosis. MRI of cervical spine was never sent to patient's PCP's office. Review of cervical MRI on [facility]'s portal revealed degenerative facet disease and moderate diffuse posterior disc bulge effacing the anterior thecal sac resulting in moderate spinal canal stenosis and bilateral neural foraminal narrowing at C4-C5 and C5-C6.     Informed that she will need to see neurosurgery for management recommendations. Patient has been referred to neurosurgery. Advised to call neurosurgeon's office with cervical spine findings and schedule an appointment to be seen. Patient requesting pain management in the interim. Reports that she was prescribed Norco 5-325 mg #30 tablets on May 27, 2020 for pain management after establishing care with PCP. Patient reports finishing prescription of [place] over the weekend at the advise of on call physician for pain management. Furthermore, patient was prescribed gabapentin 600 mg PO TID by on call physician. Reports that PCP will not refill [place] prescription. Informed patient that her PCP is likely hesitant to refill prescription because she finished a prescription in less than one week of filling it. Furthermore, PCP did not have any evidence, prior imaging studies, to corroborate patient's complaints. Advised to call PCP's office to schedule a follow up appointment and discuss pain management or referral to pain management.     Advised using no more than 3000 mg acetaminophen in 24 hours as needed for pain. Reassured patient  Dr Akers that she should be able to tolerate it since ingesting equivalence in Norco.     Spent 30 minutes on the phone with patient.    What Type Of Note Output Would You Prefer (Optional)?: Standard Output Hpi Title: Evaluation of Skin Lesions How Severe Are Your Spot(S)?: mild Have Your Spot(S) Been Treated In The Past?: has not been treated

## 2025-05-05 NOTE — PROGRESS NOTE ADULT - ASSESSMENT
Addended by: VEE ANDRADE on: 5/5/2025 10:58 AM     Modules accepted: Orders     85 year old female with ESBL Klebsiella pneumonia UTI (Cystitis), without any evidence of sepsis or pyelonephritis. Given sensitivity patterns needs to be on IV antibiotics. Urine culture obtained- shows GNR. No bacteremia. Clinically improving.  CT abdomen (04/28/17) without any evidence of stone or pyelonephritis. Does show biliary ductal dilatation and mass in area of pancreatic head. Elevated transaminases as well as slightly elevated serum Lipase.   HTN  uncontrolled at admission - improved.  Chronic atrial fibrillation is rate controlled and INR therapeutic.  Hypothyroidism, clinically stableTSH - 4.2, normal.